# Patient Record
Sex: MALE | Race: WHITE | NOT HISPANIC OR LATINO | Employment: FULL TIME | URBAN - METROPOLITAN AREA
[De-identification: names, ages, dates, MRNs, and addresses within clinical notes are randomized per-mention and may not be internally consistent; named-entity substitution may affect disease eponyms.]

---

## 2019-08-14 ENCOUNTER — LAB (OUTPATIENT)
Dept: LAB | Facility: HOSPITAL | Age: 59
End: 2019-08-14

## 2019-08-14 ENCOUNTER — TRANSCRIBE ORDERS (OUTPATIENT)
Dept: ADMINISTRATIVE | Facility: HOSPITAL | Age: 59
End: 2019-08-14

## 2019-08-14 ENCOUNTER — OFFICE VISIT (OUTPATIENT)
Dept: CARDIOLOGY | Facility: CLINIC | Age: 59
End: 2019-08-14

## 2019-08-14 VITALS
RESPIRATION RATE: 16 BRPM | HEART RATE: 52 BPM | HEIGHT: 71 IN | WEIGHT: 215 LBS | SYSTOLIC BLOOD PRESSURE: 144 MMHG | DIASTOLIC BLOOD PRESSURE: 98 MMHG | BODY MASS INDEX: 30.1 KG/M2

## 2019-08-14 DIAGNOSIS — Z01.810 PRE-OPERATIVE CARDIOVASCULAR EXAMINATION: ICD-10-CM

## 2019-08-14 DIAGNOSIS — R07.2 PRECORDIAL PAIN: ICD-10-CM

## 2019-08-14 DIAGNOSIS — Z01.810 PRE-OPERATIVE CARDIOVASCULAR EXAMINATION: Primary | ICD-10-CM

## 2019-08-14 DIAGNOSIS — R94.39 ABNORMAL NUCLEAR STRESS TEST: ICD-10-CM

## 2019-08-14 DIAGNOSIS — E78.2 MIXED HYPERLIPIDEMIA: ICD-10-CM

## 2019-08-14 DIAGNOSIS — I10 ESSENTIAL HYPERTENSION: ICD-10-CM

## 2019-08-14 DIAGNOSIS — Z13.6 SCREENING FOR ISCHEMIC HEART DISEASE: ICD-10-CM

## 2019-08-14 DIAGNOSIS — R07.2 PRECORDIAL PAIN: Primary | ICD-10-CM

## 2019-08-14 LAB
ANION GAP SERPL CALCULATED.3IONS-SCNC: 9.4 MMOL/L (ref 5–15)
BASOPHILS # BLD AUTO: 0.04 10*3/MM3 (ref 0–0.2)
BASOPHILS NFR BLD AUTO: 0.5 % (ref 0–1.5)
BUN BLD-MCNC: 20 MG/DL (ref 6–20)
BUN/CREAT SERPL: 18 (ref 7–25)
CALCIUM SPEC-SCNC: 9.8 MG/DL (ref 8.6–10.5)
CHLORIDE SERPL-SCNC: 101 MMOL/L (ref 98–107)
CO2 SERPL-SCNC: 27.6 MMOL/L (ref 22–29)
CREAT BLD-MCNC: 1.11 MG/DL (ref 0.76–1.27)
DEPRECATED RDW RBC AUTO: 41.2 FL (ref 37–54)
EOSINOPHIL # BLD AUTO: 0.2 10*3/MM3 (ref 0–0.4)
EOSINOPHIL NFR BLD AUTO: 2.5 % (ref 0.3–6.2)
ERYTHROCYTE [DISTWIDTH] IN BLOOD BY AUTOMATED COUNT: 11.3 % (ref 12.3–15.4)
GFR SERPL CREATININE-BSD FRML MDRD: 69 ML/MIN/1.73
GLUCOSE BLD-MCNC: 128 MG/DL (ref 65–99)
HCT VFR BLD AUTO: 53 % (ref 37.5–51)
HGB BLD-MCNC: 17.9 G/DL (ref 13–17.7)
IMM GRANULOCYTES # BLD AUTO: 0.02 10*3/MM3 (ref 0–0.05)
IMM GRANULOCYTES NFR BLD AUTO: 0.2 % (ref 0–0.5)
LYMPHOCYTES # BLD AUTO: 2.42 10*3/MM3 (ref 0.7–3.1)
LYMPHOCYTES NFR BLD AUTO: 30.1 % (ref 19.6–45.3)
MCH RBC QN AUTO: 33.1 PG (ref 26.6–33)
MCHC RBC AUTO-ENTMCNC: 33.8 G/DL (ref 31.5–35.7)
MCV RBC AUTO: 98 FL (ref 79–97)
MONOCYTES # BLD AUTO: 0.82 10*3/MM3 (ref 0.1–0.9)
MONOCYTES NFR BLD AUTO: 10.2 % (ref 5–12)
NEUTROPHILS # BLD AUTO: 4.55 10*3/MM3 (ref 1.7–7)
NEUTROPHILS NFR BLD AUTO: 56.5 % (ref 42.7–76)
NRBC BLD AUTO-RTO: 0 /100 WBC (ref 0–0.2)
PLATELET # BLD AUTO: 178 10*3/MM3 (ref 140–450)
PMV BLD AUTO: 10.8 FL (ref 6–12)
POTASSIUM BLD-SCNC: 5.3 MMOL/L (ref 3.5–5.2)
RBC # BLD AUTO: 5.41 10*6/MM3 (ref 4.14–5.8)
SODIUM BLD-SCNC: 138 MMOL/L (ref 136–145)
WBC NRBC COR # BLD: 8.05 10*3/MM3 (ref 3.4–10.8)

## 2019-08-14 PROCEDURE — 99204 OFFICE O/P NEW MOD 45 MIN: CPT | Performed by: INTERNAL MEDICINE

## 2019-08-14 PROCEDURE — 36415 COLL VENOUS BLD VENIPUNCTURE: CPT

## 2019-08-14 PROCEDURE — 99407 BEHAV CHNG SMOKING > 10 MIN: CPT | Performed by: INTERNAL MEDICINE

## 2019-08-14 PROCEDURE — 85025 COMPLETE CBC W/AUTO DIFF WBC: CPT

## 2019-08-14 PROCEDURE — 80048 BASIC METABOLIC PNL TOTAL CA: CPT

## 2019-08-14 PROCEDURE — 93000 ELECTROCARDIOGRAM COMPLETE: CPT | Performed by: INTERNAL MEDICINE

## 2019-08-14 RX ORDER — AMLODIPINE BESYLATE 5 MG/1
5 TABLET ORAL DAILY
COMMUNITY
Start: 2019-07-31

## 2019-08-14 RX ORDER — AMOXICILLIN 500 MG
1200 CAPSULE ORAL DAILY
COMMUNITY
End: 2022-04-05 | Stop reason: ALTCHOICE

## 2019-08-14 NOTE — H&P (VIEW-ONLY)
PATIENTINFORMATION    Date of Office Visit: 2019  Encounter Provider: Bettina Sorto MD  Place of Service: [facility] CARDIOLOGY  Patient Name: Hector Ortega  : 1960    Subjective:     Encounter Date:2019      Patient ID: Hector Ortega is a 58 y.o. male.      Hypertension   This is a new problem. The current episode started 1 to 4 weeks ago. The problem has been gradually improving since onset. The problem is controlled. Associated symptoms include chest pain. Pertinent negatives include no malaise/fatigue or shortness of breath. Agents associated with hypertension include thyroid hormones. Risk factors for coronary artery disease include family history. Compliance problems include exercise.      This is a pleasant gentleman with a history of hyperlipidemia, premature coronary artery disease in his brother, and a long history of smoking. He has been noticing that when he exerts himself or is active, he is getting this dull burning pain in his chest. He does not feel like it is associated with shortness of breath or diaphoresis. It gets better when he rests. He has not had any rest chest pain. He apparently saw a cardiologist down in [place] and they recommended a nuclear stress test which was performed on 2019 and it was abnormal showing ischemia in the inferior wall. Heart catheterization was recommended but sounds like there were some issues with getting that scheduled so his primary doctor referred him here.    He continues to have exertional chest burning. It is better with rest. It does not occur at rest. There are no associated symptoms.      Review of Systems   Constitution: Negative for fever, malaise/fatigue, weight gain and weight loss.   HENT: Negative for ear pain, hearing loss, nosebleeds and sore throat.    Eyes: Negative for double vision, pain, vision loss in left eye and vision loss in right eye.   Cardiovascular: Positive for chest pain.   "       See history of present illness.   Respiratory: Negative for cough, shortness of breath, sleep disturbances due to breathing, snoring and wheezing.    Endocrine: Negative for cold intolerance, heat intolerance and polyuria.   Skin: Negative for itching, poor wound healing and rash.   Musculoskeletal: Negative for joint pain, joint swelling and myalgias.   Gastrointestinal: Negative for abdominal pain, diarrhea, hematochezia, nausea and vomiting.   Genitourinary: Negative for hematuria and hesitancy.   Neurological: Negative for numbness, paresthesias and seizures.   Psychiatric/Behavioral: Negative for depression. The patient is not nervous/anxious.            ECG 12 Lead  Date/Time: 8/14/2019 9:38 AM  Performed by: Bettina Sorto MD  Authorized by: Bettina Sorto MD   Previous ECG: no previous ECG available  Rhythm: sinus rhythm  BPM: 52  Conduction: conduction normal  ST Segments: ST segments normal  T Waves: T waves normal    Clinical impression: normal ECG               Objective:     /98 (BP Location: Right arm, Patient Position: Sitting)   Pulse 52   Resp 16   Ht 180.3 cm (71\")   Wt 97.5 kg (215 lb)   BMI 29.99 kg/m²  Body mass index is 29.99 kg/m².     Physical Exam   Constitutional: He appears well-developed.   HENT:   Head: Normocephalic and atraumatic.   Eyes: Conjunctivae and lids are normal. Pupils are equal, round, and reactive to light. Lids are everted and swept, no foreign bodies found.   Neck: Normal range of motion. No JVD present. Carotid bruit is not present. No tracheal deviation present. No thyroid mass present.   Cardiovascular: Normal rate, regular rhythm and normal heart sounds.   Pulses:       Dorsalis pedis pulses are 2+ on the right side, and 2+ on the left side.   Pulmonary/Chest: Effort normal and breath sounds normal.   Abdominal: Normal appearance and bowel sounds are normal.   Musculoskeletal: Normal range of motion.   Neurological: He is alert. He has normal " strength.   Skin: Skin is warm, dry and intact.   Psychiatric: He has a normal mood and affect. His behavior is normal.   Vitals reviewed.      Lab Review: I reviewed his labs from his primary doctor.  These were performed in July 2019 total cholesterol 179, triglycerides 161, HDL 21 and       Assessment/Plan:       1.  Chest pain consistent with ischemia.  He had an abnormal nuclear stress test with inferior wall ischemia at [facility] at the end of July.  His risk factors include hypertension, smoking, and premature coronary disease in his brother.  I recommend proceeding with a heart catheterization.  We discussed this and he and his wife were agreeable.  2.  Borderline diabetes.  3.  Systemic hypertension.  His blood pressure is elevated today.  I recommended that he monitor it.  We may need to adjust his medications.  4.  Hyperlipidemia.  I have reviewed his lipid panel and it is not great.  If he has coronary disease, then he will need statin therapy.  5.  Smoking.  He is a heavy smoker who is now down to about a pack a day.  I have encouraged him to discontinue smoking.  He wants to quit smoking, but is not ready to make a definitive plan or set a quit date.  6.  Brother with a heart attack in his mid 50s.    I will followup with him after his heart catheterization.    Hector Ortega is a current cigarettes user.  He currently smokes 1 pack of cigarettes per day for a duration of 40 years. I have educated him on the risk of diseases from using tobacco products such as heart diease.     I advised him to quit and he is not willing to quit.  He is contemplating quitting.    I spent >10 minutes counseling the patient.      Orders Placed This Encounter   Procedures   • ECG 12 Lead     This order was created via procedure documentation        Discharge Medications           Accurate as of 8/14/19 11:28 AM. If you have any questions, ask your nurse or doctor.               Continue These Medications       Instructions Start Date   amLODIPine 5 MG tablet  Commonly known as:  NORVASC   5 mg, Oral, Daily      aspirin 81 MG EC tablet   81 mg, Oral, Daily      atenolol 25 MG tablet  Commonly known as:  TENORMIN   25 mg, Oral, Daily      fish oil 1200 MG capsule capsule   1,200 mg, Oral, Daily      Peppermint Flavor oil   1 capsule, Oral, Daily                    Bettina Sorto MD  08/14/19  11:28 AM

## 2019-08-14 NOTE — PROGRESS NOTES
PATIENTINFORMATION    Date of Office Visit: 2019  Encounter Provider: Bettina Sorto MD  Place of Service: [facility] CARDIOLOGY  Patient Name: Hector Ortega  : 1960    Subjective:     Encounter Date:2019      Patient ID: Hector Ortega is a 58 y.o. male.      Hypertension   This is a new problem. The current episode started 1 to 4 weeks ago. The problem has been gradually improving since onset. The problem is controlled. Associated symptoms include chest pain. Pertinent negatives include no malaise/fatigue or shortness of breath. Agents associated with hypertension include thyroid hormones. Risk factors for coronary artery disease include family history. Compliance problems include exercise.      This is a pleasant gentleman with a history of hyperlipidemia, premature coronary artery disease in his brother, and a long history of smoking. He has been noticing that when he exerts himself or is active, he is getting this dull burning pain in his chest. He does not feel like it is associated with shortness of breath or diaphoresis. It gets better when he rests. He has not had any rest chest pain. He apparently saw a cardiologist down in [place] and they recommended a nuclear stress test which was performed on 2019 and it was abnormal showing ischemia in the inferior wall. Heart catheterization was recommended but sounds like there were some issues with getting that scheduled so his primary doctor referred him here.    He continues to have exertional chest burning. It is better with rest. It does not occur at rest. There are no associated symptoms.      Review of Systems   Constitution: Negative for fever, malaise/fatigue, weight gain and weight loss.   HENT: Negative for ear pain, hearing loss, nosebleeds and sore throat.    Eyes: Negative for double vision, pain, vision loss in left eye and vision loss in right eye.   Cardiovascular: Positive for chest pain.  "       See history of present illness.   Respiratory: Negative for cough, shortness of breath, sleep disturbances due to breathing, snoring and wheezing.    Endocrine: Negative for cold intolerance, heat intolerance and polyuria.   Skin: Negative for itching, poor wound healing and rash.   Musculoskeletal: Negative for joint pain, joint swelling and myalgias.   Gastrointestinal: Negative for abdominal pain, diarrhea, hematochezia, nausea and vomiting.   Genitourinary: Negative for hematuria and hesitancy.   Neurological: Negative for numbness, paresthesias and seizures.   Psychiatric/Behavioral: Negative for depression. The patient is not nervous/anxious.            ECG 12 Lead  Date/Time: 8/14/2019 9:38 AM  Performed by: Bettina Sorto MD  Authorized by: Bettina Sorto MD   Previous ECG: no previous ECG available  Rhythm: sinus rhythm  BPM: 52  Conduction: conduction normal  ST Segments: ST segments normal  T Waves: T waves normal    Clinical impression: normal ECG               Objective:     /98 (BP Location: Right arm, Patient Position: Sitting)   Pulse 52   Resp 16   Ht 180.3 cm (71\")   Wt 97.5 kg (215 lb)   BMI 29.99 kg/m²  Body mass index is 29.99 kg/m².     Physical Exam   Constitutional: He appears well-developed.   HENT:   Head: Normocephalic and atraumatic.   Eyes: Conjunctivae and lids are normal. Pupils are equal, round, and reactive to light. Lids are everted and swept, no foreign bodies found.   Neck: Normal range of motion. No JVD present. Carotid bruit is not present. No tracheal deviation present. No thyroid mass present.   Cardiovascular: Normal rate, regular rhythm and normal heart sounds.   Pulses:       Dorsalis pedis pulses are 2+ on the right side, and 2+ on the left side.   Pulmonary/Chest: Effort normal and breath sounds normal.   Abdominal: Normal appearance and bowel sounds are normal.   Musculoskeletal: Normal range of motion.   Neurological: He is alert. He has normal " strength.   Skin: Skin is warm, dry and intact.   Psychiatric: He has a normal mood and affect. His behavior is normal.   Vitals reviewed.      Lab Review: I reviewed his labs from his primary doctor.  These were performed in July 2019 total cholesterol 179, triglycerides 161, HDL 21 and       Assessment/Plan:       1.  Chest pain consistent with ischemia.  He had an abnormal nuclear stress test with inferior wall ischemia at [facility] at the end of July.  His risk factors include hypertension, smoking, and premature coronary disease in his brother.  I recommend proceeding with a heart catheterization.  We discussed this and he and his wife were agreeable.  2.  Borderline diabetes.  3.  Systemic hypertension.  His blood pressure is elevated today.  I recommended that he monitor it.  We may need to adjust his medications.  4.  Hyperlipidemia.  I have reviewed his lipid panel and it is not great.  If he has coronary disease, then he will need statin therapy.  5.  Smoking.  He is a heavy smoker who is now down to about a pack a day.  I have encouraged him to discontinue smoking.  He wants to quit smoking, but is not ready to make a definitive plan or set a quit date.  6.  Brother with a heart attack in his mid 50s.    I will followup with him after his heart catheterization.    Hector Ortega is a current cigarettes user.  He currently smokes 1 pack of cigarettes per day for a duration of 40 years. I have educated him on the risk of diseases from using tobacco products such as heart diease.     I advised him to quit and he is not willing to quit.  He is contemplating quitting.    I spent >10 minutes counseling the patient.      Orders Placed This Encounter   Procedures   • ECG 12 Lead     This order was created via procedure documentation        Discharge Medications           Accurate as of 8/14/19 11:28 AM. If you have any questions, ask your nurse or doctor.               Continue These Medications       Instructions Start Date   amLODIPine 5 MG tablet  Commonly known as:  NORVASC   5 mg, Oral, Daily      aspirin 81 MG EC tablet   81 mg, Oral, Daily      atenolol 25 MG tablet  Commonly known as:  TENORMIN   25 mg, Oral, Daily      fish oil 1200 MG capsule capsule   1,200 mg, Oral, Daily      Peppermint Flavor oil   1 capsule, Oral, Daily                    Bettina Sorto MD  08/14/19  11:28 AM

## 2019-08-20 ENCOUNTER — HOSPITAL ENCOUNTER (OUTPATIENT)
Facility: HOSPITAL | Age: 59
Discharge: HOME | End: 2019-08-21
Attending: INTERNAL MEDICINE | Admitting: INTERNAL MEDICINE

## 2019-08-20 DIAGNOSIS — R07.2 PRECORDIAL PAIN: ICD-10-CM

## 2019-08-20 DIAGNOSIS — E78.2 MIXED HYPERLIPIDEMIA: ICD-10-CM

## 2019-08-20 DIAGNOSIS — I10 ESSENTIAL HYPERTENSION: ICD-10-CM

## 2019-08-20 DIAGNOSIS — I25.110 CORONARY ARTERY DISEASE INVOLVING NATIVE CORONARY ARTERY OF NATIVE HEART WITH UNSTABLE ANGINA PECTORIS (HCC): Primary | ICD-10-CM

## 2019-08-20 PROCEDURE — G0378 HOSPITAL OBSERVATION PER HR: HCPCS

## 2019-08-20 PROCEDURE — 25010000002 HEPARIN (PORCINE) PER 1000 UNITS: Performed by: INTERNAL MEDICINE

## 2019-08-20 PROCEDURE — 92928 PRQ TCAT PLMT NTRAC ST 1 LES: CPT | Performed by: INTERNAL MEDICINE

## 2019-08-20 PROCEDURE — C1874 STENT, COATED/COV W/DEL SYS: HCPCS | Performed by: INTERNAL MEDICINE

## 2019-08-20 PROCEDURE — 99152 MOD SED SAME PHYS/QHP 5/>YRS: CPT | Performed by: INTERNAL MEDICINE

## 2019-08-20 PROCEDURE — C1725 CATH, TRANSLUMIN NON-LASER: HCPCS | Performed by: INTERNAL MEDICINE

## 2019-08-20 PROCEDURE — C1769 GUIDE WIRE: HCPCS | Performed by: INTERNAL MEDICINE

## 2019-08-20 PROCEDURE — 25010000002 FENTANYL CITRATE (PF) 100 MCG/2ML SOLUTION: Performed by: INTERNAL MEDICINE

## 2019-08-20 PROCEDURE — 93010 ELECTROCARDIOGRAM REPORT: CPT | Performed by: INTERNAL MEDICINE

## 2019-08-20 PROCEDURE — 85347 COAGULATION TIME ACTIVATED: CPT

## 2019-08-20 PROCEDURE — 93458 L HRT ARTERY/VENTRICLE ANGIO: CPT | Performed by: INTERNAL MEDICINE

## 2019-08-20 PROCEDURE — C1894 INTRO/SHEATH, NON-LASER: HCPCS | Performed by: INTERNAL MEDICINE

## 2019-08-20 PROCEDURE — C1887 CATHETER, GUIDING: HCPCS | Performed by: INTERNAL MEDICINE

## 2019-08-20 PROCEDURE — 93005 ELECTROCARDIOGRAM TRACING: CPT | Performed by: INTERNAL MEDICINE

## 2019-08-20 PROCEDURE — C9600 PERC DRUG-EL COR STENT SING: HCPCS | Performed by: INTERNAL MEDICINE

## 2019-08-20 PROCEDURE — 0 IOPAMIDOL PER 1 ML: Performed by: INTERNAL MEDICINE

## 2019-08-20 PROCEDURE — 25010000002 MIDAZOLAM PER 1 MG: Performed by: INTERNAL MEDICINE

## 2019-08-20 PROCEDURE — 99153 MOD SED SAME PHYS/QHP EA: CPT | Performed by: INTERNAL MEDICINE

## 2019-08-20 DEVICE — XIENCE SIERRA™ EVEROLIMUS ELUTING CORONARY STENT SYSTEM 3.50 MM X 38 MM / RAPID-EXCHANGE
Type: IMPLANTABLE DEVICE | Status: FUNCTIONAL
Brand: XIENCE SIERRA™

## 2019-08-20 DEVICE — XIENCE SIERRA™ EVEROLIMUS ELUTING CORONARY STENT SYSTEM 4.00 MM X 12 MM / RAPID-EXCHANGE
Type: IMPLANTABLE DEVICE | Status: FUNCTIONAL
Brand: XIENCE SIERRA™

## 2019-08-20 RX ORDER — ATORVASTATIN CALCIUM 20 MG/1
40 TABLET, FILM COATED ORAL NIGHTLY
Status: DISCONTINUED | OUTPATIENT
Start: 2019-08-20 | End: 2019-08-21 | Stop reason: HOSPADM

## 2019-08-20 RX ORDER — FENTANYL CITRATE 50 UG/ML
INJECTION, SOLUTION INTRAMUSCULAR; INTRAVENOUS AS NEEDED
Status: DISCONTINUED | OUTPATIENT
Start: 2019-08-20 | End: 2019-08-20 | Stop reason: HOSPADM

## 2019-08-20 RX ORDER — SODIUM CHLORIDE 9 MG/ML
50 INJECTION, SOLUTION INTRAVENOUS CONTINUOUS
Status: ACTIVE | OUTPATIENT
Start: 2019-08-20 | End: 2019-08-21

## 2019-08-20 RX ORDER — AMLODIPINE BESYLATE 5 MG/1
5 TABLET ORAL DAILY
Status: DISCONTINUED | OUTPATIENT
Start: 2019-08-20 | End: 2019-08-21 | Stop reason: HOSPADM

## 2019-08-20 RX ORDER — SODIUM CHLORIDE 9 MG/ML
75 INJECTION, SOLUTION INTRAVENOUS CONTINUOUS
Status: DISCONTINUED | OUTPATIENT
Start: 2019-08-20 | End: 2019-08-21 | Stop reason: HOSPADM

## 2019-08-20 RX ORDER — NALOXONE HCL 0.4 MG/ML
0.4 VIAL (ML) INJECTION
Status: DISCONTINUED | OUTPATIENT
Start: 2019-08-20 | End: 2019-08-21 | Stop reason: HOSPADM

## 2019-08-20 RX ORDER — SODIUM CHLORIDE 0.9 % (FLUSH) 0.9 %
3 SYRINGE (ML) INJECTION EVERY 12 HOURS SCHEDULED
Status: DISCONTINUED | OUTPATIENT
Start: 2019-08-20 | End: 2019-08-20 | Stop reason: HOSPADM

## 2019-08-20 RX ORDER — LIDOCAINE HYDROCHLORIDE 10 MG/ML
0.1 INJECTION, SOLUTION EPIDURAL; INFILTRATION; INTRACAUDAL; PERINEURAL ONCE AS NEEDED
Status: DISCONTINUED | OUTPATIENT
Start: 2019-08-20 | End: 2019-08-20 | Stop reason: HOSPADM

## 2019-08-20 RX ORDER — LEVOTHYROXINE SODIUM 0.07 MG/1
75 TABLET ORAL DAILY
COMMUNITY

## 2019-08-20 RX ORDER — HYDROCODONE BITARTRATE AND ACETAMINOPHEN 5; 325 MG/1; MG/1
1 TABLET ORAL EVERY 4 HOURS PRN
Status: DISCONTINUED | OUTPATIENT
Start: 2019-08-20 | End: 2019-08-21 | Stop reason: HOSPADM

## 2019-08-20 RX ORDER — LEVOTHYROXINE SODIUM 0.07 MG/1
75 TABLET ORAL DAILY
Status: DISCONTINUED | OUTPATIENT
Start: 2019-08-20 | End: 2019-08-21 | Stop reason: HOSPADM

## 2019-08-20 RX ORDER — MORPHINE SULFATE 2 MG/ML
1 INJECTION, SOLUTION INTRAMUSCULAR; INTRAVENOUS EVERY 4 HOURS PRN
Status: DISCONTINUED | OUTPATIENT
Start: 2019-08-20 | End: 2019-08-21 | Stop reason: HOSPADM

## 2019-08-20 RX ORDER — SODIUM CHLORIDE 0.9 % (FLUSH) 0.9 %
3-10 SYRINGE (ML) INJECTION AS NEEDED
Status: DISCONTINUED | OUTPATIENT
Start: 2019-08-20 | End: 2019-08-20 | Stop reason: HOSPADM

## 2019-08-20 RX ORDER — TEMAZEPAM 15 MG/1
15 CAPSULE ORAL NIGHTLY PRN
Status: DISCONTINUED | OUTPATIENT
Start: 2019-08-20 | End: 2019-08-21 | Stop reason: HOSPADM

## 2019-08-20 RX ORDER — ATENOLOL 25 MG/1
25 TABLET ORAL DAILY
Status: DISCONTINUED | OUTPATIENT
Start: 2019-08-20 | End: 2019-08-21 | Stop reason: HOSPADM

## 2019-08-20 RX ORDER — MIDAZOLAM HYDROCHLORIDE 1 MG/ML
INJECTION INTRAMUSCULAR; INTRAVENOUS AS NEEDED
Status: DISCONTINUED | OUTPATIENT
Start: 2019-08-20 | End: 2019-08-20 | Stop reason: HOSPADM

## 2019-08-20 RX ORDER — ACETAMINOPHEN 325 MG/1
650 TABLET ORAL EVERY 4 HOURS PRN
Status: DISCONTINUED | OUTPATIENT
Start: 2019-08-20 | End: 2019-08-21 | Stop reason: HOSPADM

## 2019-08-20 RX ORDER — LIDOCAINE HYDROCHLORIDE 20 MG/ML
INJECTION, SOLUTION INFILTRATION; PERINEURAL AS NEEDED
Status: DISCONTINUED | OUTPATIENT
Start: 2019-08-20 | End: 2019-08-20 | Stop reason: HOSPADM

## 2019-08-20 RX ORDER — HEPARIN SODIUM 1000 [USP'U]/ML
INJECTION, SOLUTION INTRAVENOUS; SUBCUTANEOUS AS NEEDED
Status: DISCONTINUED | OUTPATIENT
Start: 2019-08-20 | End: 2019-08-20 | Stop reason: HOSPADM

## 2019-08-20 RX ORDER — ASPIRIN 81 MG/1
81 TABLET ORAL DAILY
Status: DISCONTINUED | OUTPATIENT
Start: 2019-08-20 | End: 2019-08-21 | Stop reason: HOSPADM

## 2019-08-20 RX ADMIN — ATORVASTATIN CALCIUM 40 MG: 20 TABLET, FILM COATED ORAL at 20:24

## 2019-08-20 RX ADMIN — SODIUM CHLORIDE 75 ML/HR: 9 INJECTION, SOLUTION INTRAVENOUS at 09:20

## 2019-08-20 RX ADMIN — TICAGRELOR 90 MG: 90 TABLET ORAL at 20:24

## 2019-08-20 RX ADMIN — SODIUM CHLORIDE 50 ML/HR: 9 INJECTION, SOLUTION INTRAVENOUS at 16:00

## 2019-08-20 RX ADMIN — Medication 3 ML: at 12:46

## 2019-08-20 NOTE — PLAN OF CARE
Problem: Patient Care Overview  Goal: Plan of Care Review  Outcome: Ongoing (interventions implemented as appropriate)   08/20/19 6281   Coping/Psychosocial   Plan of Care Reviewed With patient   Plan of Care Review   Progress improving   OTHER   Outcome Summary S/P PCI, right radial site C/D/I and soft. No c/o's of pain or SOA. IVFs currently infusing. VSS. Will continue to monitor.

## 2019-08-20 NOTE — PLAN OF CARE
Problem: Cardiac Catheterization (Diagnostic/Interventional) (Adult)  Goal: Signs and Symptoms of Listed Potential Problems Will be Absent, Minimized or Managed (Cardiac Catheterization)  Outcome: Ongoing (interventions implemented as appropriate)    Goal: Anesthesia/Sedation Recovery  Outcome: Ongoing (interventions implemented as appropriate)      Problem: Cardiac: ACS (Acute Coronary Syndrome) (Adult)  Goal: Signs and Symptoms of Listed Potential Problems Will be Absent, Minimized or Managed (Cardiac: ACS)  Outcome: Ongoing (interventions implemented as appropriate)

## 2019-08-21 VITALS
TEMPERATURE: 97.5 F | HEART RATE: 54 BPM | OXYGEN SATURATION: 97 % | BODY MASS INDEX: 29.96 KG/M2 | WEIGHT: 214 LBS | SYSTOLIC BLOOD PRESSURE: 131 MMHG | HEIGHT: 71 IN | DIASTOLIC BLOOD PRESSURE: 100 MMHG | RESPIRATION RATE: 16 BRPM

## 2019-08-21 LAB
ACT BLD: 351 SECONDS (ref 82–152)
ANION GAP SERPL CALCULATED.3IONS-SCNC: 13.1 MMOL/L (ref 5–15)
BUN BLD-MCNC: 17 MG/DL (ref 6–20)
BUN/CREAT SERPL: 16 (ref 7–25)
CALCIUM SPEC-SCNC: 8.8 MG/DL (ref 8.6–10.5)
CHLORIDE SERPL-SCNC: 103 MMOL/L (ref 98–107)
CHOLEST SERPL-MCNC: 157 MG/DL (ref 0–200)
CO2 SERPL-SCNC: 23.9 MMOL/L (ref 22–29)
CREAT BLD-MCNC: 1.06 MG/DL (ref 0.76–1.27)
DEPRECATED RDW RBC AUTO: 41.7 FL (ref 37–54)
ERYTHROCYTE [DISTWIDTH] IN BLOOD BY AUTOMATED COUNT: 11.5 % (ref 12.3–15.4)
GFR SERPL CREATININE-BSD FRML MDRD: 72 ML/MIN/1.73
GLUCOSE BLD-MCNC: 108 MG/DL (ref 65–99)
HBA1C MFR BLD: 5.6 % (ref 4.8–5.6)
HCT VFR BLD AUTO: 48 % (ref 37.5–51)
HDLC SERPL-MCNC: 20 MG/DL (ref 40–60)
HGB BLD-MCNC: 16.2 G/DL (ref 13–17.7)
LDLC SERPL CALC-MCNC: 71 MG/DL (ref 0–100)
LDLC/HDLC SERPL: 3.55 {RATIO}
MCH RBC QN AUTO: 33.1 PG (ref 26.6–33)
MCHC RBC AUTO-ENTMCNC: 33.8 G/DL (ref 31.5–35.7)
MCV RBC AUTO: 98.2 FL (ref 79–97)
PLATELET # BLD AUTO: 157 10*3/MM3 (ref 140–450)
PMV BLD AUTO: 10.7 FL (ref 6–12)
POTASSIUM BLD-SCNC: 3.7 MMOL/L (ref 3.5–5.2)
RBC # BLD AUTO: 4.89 10*6/MM3 (ref 4.14–5.8)
SODIUM BLD-SCNC: 140 MMOL/L (ref 136–145)
TRIGL SERPL-MCNC: 330 MG/DL (ref 0–150)
VLDLC SERPL-MCNC: 66 MG/DL (ref 5–40)
WBC NRBC COR # BLD: 7.7 10*3/MM3 (ref 3.4–10.8)

## 2019-08-21 PROCEDURE — 80048 BASIC METABOLIC PNL TOTAL CA: CPT | Performed by: INTERNAL MEDICINE

## 2019-08-21 PROCEDURE — 83036 HEMOGLOBIN GLYCOSYLATED A1C: CPT | Performed by: INTERNAL MEDICINE

## 2019-08-21 PROCEDURE — 93010 ELECTROCARDIOGRAM REPORT: CPT | Performed by: INTERNAL MEDICINE

## 2019-08-21 PROCEDURE — 80061 LIPID PANEL: CPT | Performed by: INTERNAL MEDICINE

## 2019-08-21 PROCEDURE — 85027 COMPLETE CBC AUTOMATED: CPT | Performed by: INTERNAL MEDICINE

## 2019-08-21 PROCEDURE — 99214 OFFICE O/P EST MOD 30 MIN: CPT | Performed by: NURSE PRACTITIONER

## 2019-08-21 PROCEDURE — 93005 ELECTROCARDIOGRAM TRACING: CPT | Performed by: INTERNAL MEDICINE

## 2019-08-21 RX ORDER — ATORVASTATIN CALCIUM 40 MG/1
40 TABLET, FILM COATED ORAL NIGHTLY
Qty: 30 TABLET | Refills: 11 | Status: SHIPPED | OUTPATIENT
Start: 2019-08-21 | End: 2020-08-28

## 2019-08-21 RX ADMIN — LEVOTHYROXINE SODIUM 75 MCG: 75 TABLET ORAL at 08:34

## 2019-08-21 RX ADMIN — AMLODIPINE BESYLATE 5 MG: 5 TABLET ORAL at 08:34

## 2019-08-21 RX ADMIN — TICAGRELOR 90 MG: 90 TABLET ORAL at 08:34

## 2019-08-21 RX ADMIN — ASPIRIN 81 MG: 81 TABLET, COATED ORAL at 08:34

## 2019-08-21 RX ADMIN — ATENOLOL 25 MG: 25 TABLET ORAL at 08:34

## 2019-08-21 NOTE — CONSULTS
"Met with patient and family, discussed benefits of cardiac rehab. Provided phase II information packet, which includes; general information about cardiac rehab, [facility] Cardiac Rehab Programs handout and [employer] letter article entitled “Cardiac Rehab is often the Best Medicine for Recovery\", stresses the importance of cardiac rehab after a heart event.   I provided the contact information for cardiac rehab here at [facility] and encouraged him to call when discharged.   Instructed to bring a copy of After Visit Summary to initial assessment.      "

## 2019-08-21 NOTE — DISCHARGE SUMMARY
Hospital Discharge    Patient Name: Hector Ortega  Age/Sex: 58 y.o. male  : 1960  MRN: [id number]    Encounter Provider: KITA Bustillos  Referring Provider: Mahin Dominique MD  Place of Service: [facility] CARDIOLOGY  Patient Care Team:  Benjamin Barrow MD as PCP - General (Family Medicine)         Date of Discharge:  2019   Date of Admit: 2019    Discharge Condition: Stable  Discharge Diagnosis:    Precordial pain    Essential hypertension    Mixed hyperlipidemia  coronary artery disease of native artery with unstable angina.     Hospital Course:   Hector Ortega is a 58 y.o. male who was seen in the office on  by Dr. Sorto as a new patient. He is a current smoker and has a family history of premature coronary artery disease in his brother. He complained of dull burning pain in his chest with exertion. He had a recent stress in [place] which showed inferior ischemia. A cardiac cath was recommended and due to scheduling issues was not performed and he was referred to our office. On  he had a cardiac cath that showed chronic total occlusion of the RCA with left to right collaterals as well as 95% mid LAD stenosis. This was treated with drug eluting stent placement (3.5 x 38 mm Xience Trina drug eluting stent). A second stent was placed to the prox LAD for residual stenosis (4.0 X 12 mm Xience Trina). He had normal LV size and function per LV gram.  He was monitored overnight. His BP was mildly high. He had recently been started on amlodipine by Dr. Sorto. We will follow closely as an outpatient.     Patient will see KITA Blackburn in one week and Dr. Sorto in one month. He is a current smoker and was encouraged to stop.     Objective:  Temp:  [97.5 °F (36.4 °C)-98.2 °F (36.8 °C)] 97.5 °F (36.4 °C)  Heart Rate:  [47-68] 54  Resp:  [16] 16  BP: (126-154)/() 131/100    Intake/Output Summary (Last 24 hours) at 2019 1246  Last data filed  at 8/21/2019 1154  Gross per 24 hour   Intake 1170 ml   Output --   Net 1170 ml     Body mass index is 29.85 kg/m².      08/20/19  0902   Weight: 97.1 kg (214 lb)     Weight change:     Physical Exam:  Constitutional: He is oriented to person, place, and time. He appears well-developed. He does not appear ill.   Neck: No JVD present.   Cardiovascular: Normal rate, regular rhythm and normal heart sounds.    Pulses:       Posterior tibial pulses are 2+ on the right side, and 2+ on the left side.   Pulmonary/Chest: Effort normal and breath sounds normal.   Abdominal: Soft. Normal appearance and bowel sounds are normal. There is no tenderness.   Musculoskeletal: Normal range of motion.        Right shoulder: He exhibits no deformity.        Left shoulder: He exhibits no deformity.   Neurological: He is alert and oriented to person, place, and time. He has normal strength.   Skin: Skin is warm, dry and intact. No rash noted.   Psychiatric: He has a normal mood and affect. His behavior is normal. Thought content normal.   Vitals reviewed  Right radial artery, soft, no hematoma. Pulses intact.     Procedures Performed  Procedure(s):  Coronary angiography  Left Heart Cath  Left ventriculography  Stent PENG coronary    Procedure findings:  Left main: Large caliber vessel that bifurcates to an LAD and circumflex.  Normal  LAD: Medium caliber vessel that gives rise to a small caliber diagonal branch in the proximal segment and a small caliber common septal .  The proximal LAD is normal.  The mid LAD has a hazy, calcified, 95% stenosis with WILLY II flow distally.  The small caliber diagonal branch has a 60% proximal stenosis  LCX: Medium caliber vessel that gives rise to a small caliber OM1 branch.  The circumflex has luminal irregularities.  RCA: Large-caliber, dominant vessel it is chronically occluded in the proximal segment and fills faintly via bridging collaterals but mostly through left to right  collaterals.     Left ventricular angiography: Normal left ventricular size and systolic function.  Left ventricular ejection fraction is 55%.     Percutaneous intervention report: Unfractionated heparin was used for anticoagulation and confirm therapeutic ACT.  A 6 Cymraes XB 3.5 guide catheter was used to engage left main coronary artery.  Following this a 0.014 inch run-through wire was used to cross the lesion and seated in the distal LAD.  A 3.5 x 20 mm trek balloon was used to predilate the lesion.  A 3.5 x 38 mm Xience Trina drug-eluting stent was deployed across the mid LAD stenosis at 14 cleo.  This was postdilated with a 3.5 x 20 mm noncompliant trek balloon to high pressure and a 3.75 x 12 mm noncompliant trek to24 cleo.  There was residual lesion in the proximal LAD that was covered with a 4.0 x 12 mm Xience Trina drug-eluting stent.  The stent balloon was used to post dilate the proximal stent and the overlapping segment.  The common septal  was jailed by stent area patient was chest pain-free and no additional intervention was undertaken.     Hemodynamics:   LV: 98/8  AO: 98/60        PCI CORONARY SEGMENT: Mid LAD  PRE-STENOSIS: 95  POST-STENOSIS: 0%  LESION TYPE: c  WILLY FLOW PRE/POST: 2/3  CULPRIT LESION: Yes     Estimated blood loss: Minimal  Complications: None     Conclusions:   1. Left main: Normal  2. LAD: Hazy, calcified 95% mid vessel stenosis with WILLY II flow  3. LCX: Luminal irregularities  4. RCA: Chronic total occlusion in the proximal segment.  Fills via left to right collaterals.  5.  Normal left ventricular size and systolic function  6.  PCI of the proximal to mid LAD with overlapping 4.0 x 12 mm and 3.5 x 38 mm Xience Trina drug-eluting stents, postdilated to high pressure     Recommendations: Dual antiplatelet therapy for at least one year.  Medical management of RCA        Consults:  Consults     No orders found for last 30 day(s).          Pertinent Test  Results:  Results from last 7 days   Lab Units 08/21/19  0503   SODIUM mmol/L 140   POTASSIUM mmol/L 3.7   CHLORIDE mmol/L 103   CO2 mmol/L 23.9   BUN mg/dL 17   CREATININE mg/dL 1.06   GLUCOSE mg/dL 108*   CALCIUM mg/dL 8.8         Results from last 7 days   Lab Units 08/21/19  0503   WBC 10*3/mm3 7.70   HEMOGLOBIN g/dL 16.2   HEMATOCRIT % 48.0   PLATELETS 10*3/mm3 157             Results from last 7 days   Lab Units 08/21/19  0503   CHOLESTEROL mg/dL 157   TRIGLYCERIDES mg/dL 330*   HDL CHOL mg/dL 20*               Discharge Medications     Discharge Medications      New Medications      Instructions Start Date   atorvastatin 40 MG tablet  Commonly known as:  LIPITOR   40 mg, Oral, Nightly      ticagrelor 90 MG tablet tablet  Commonly known as:  BRILINTA   90 mg, Oral, 2 Times Daily         Continue These Medications      Instructions Start Date   amLODIPine 5 MG tablet  Commonly known as:  NORVASC   5 mg, Oral, Daily      aspirin 81 MG EC tablet   81 mg, Oral, Daily      atenolol 25 MG tablet  Commonly known as:  TENORMIN   25 mg, Oral, Daily      fish oil 1200 MG capsule capsule   1,200 mg, Oral, Daily      levothyroxine 75 MCG tablet  Commonly known as:  SYNTHROID, LEVOTHROID   75 mcg, Oral, Daily      Peppermint Flavor oil   1 capsule, Oral, Daily             Discharge Diet:    Dietary Orders (From admission, onward)    Start     Ordered    08/20/19 1500  Diet Regular; Consistent Carbohydrate, Cardiac  Diet Effective Now     Question Answer Comment   Diet Texture / Consistency Regular    Common Modifiers Consistent Carbohydrate    Common Modifiers Cardiac        08/20/19 1500          Activity at Discharge:    Activity Instructions     Gradually Increase Activity Until at Pre-Hospitalization Level      Routine post cardiac catheterization/PCI discharge home care instructions:    1. No submerging procedure site below water for 7-10 days.  2. No lifting objects greater than 1 lbs for 3 days.  3. If groin site  used, avoid climbing several flights of stairs or sitting for longer than 2 hours at a time for the next 24 hours.   4. Monitor puncture site for bleeding and/or knots;. If bleeding should occur at the groin site: lie flat, apply pressure and return to the ER. If bleeding should occur at the wrist site, apply pressure and return to the ER.  5.  You may apply a DRY Band-Aid over the puncture site if needed. Do not apply any lotions, salves or ointments to site.  6. No driving for 24 hrs  7. Return to ER for recurrent symptoms.  8. No smoking.  9. Take all medications as prescribed.           Discharge disposition: home     Discharge Instructions and Follow ups:  Future Appointments   Date Time Provider Department Center   8/27/2019 10:30 AM Johana Hernández DNP, APRN [facility] None   10/11/2019 11:00 AM Bettina Sorto MD [facility] None     Additional Instructions for the Follow-ups that You Need to Schedule     Ambulatory Referral to Cardiac Rehab   As directed      Discharge Follow-up with Specified Provider: Dr. Sorto; 1 Month   As directed      To:  Dr. Sorto    Follow Up:  1 Month         Discharge Follow-up with Specified Provider: KITA Blackburn; 1 Week   As directed      To:  KITA Blackburn    Follow Up:  1 Week           Follow-up Information     Benjamin Barrow MD. Schedule an appointment as soon as possible for a visit in 1 week(s).    Specialty:  Family Medicine  Why:  A message was left with the office, please call later today about your one week appointment.   Contact information:  [address]  [facility] [address] [phone]             Bettina Sorto MD. Schedule an appointment as soon as possible for a visit in 1 month(s).    Specialty:  Cardiology  Why:  Call the office for your appointments at one week and one month  Contact information:  [address]  [facility] [facility] [address] [phone]                   Test Results Pending at Discharge: none     Evy DUNNE  KITA Joel  08/21/19  12:46 PM

## 2019-08-21 NOTE — PLAN OF CARE
Problem: Patient Care Overview  Goal: Plan of Care Review  Outcome: Outcome(s) achieved Date Met: 08/21/19 08/21/19 6672   Coping/Psychosocial   Plan of Care Reviewed With patient   Plan of Care Review   Progress improving   OTHER   Outcome Summary Patient being discharged. Denies complaints. Spouse to take patient home. Vital signs stable.

## 2019-08-22 ENCOUNTER — TELEPHONE (OUTPATIENT)
Dept: CARDIAC REHAB | Facility: HOSPITAL | Age: 59
End: 2019-08-22

## 2019-08-22 NOTE — TELEPHONE ENCOUNTER
Referral received.  Called patient to set up outpatient cardiac rehab.  He states since he was discharged yesterday, he & his wife haven't decided where would be closest facility for cardiac rehab.  (He lives in [place] and unfortunately [facility] no longer offers the program.)  We discussed possibilities ([facility]) which patient states he will review with his wife.  He also may check with his insurance company regarding coverage/cost to him.  I was able to do some smoking cessation and general risk factor education with him over the phone.  He was receptive and states that he hasn't smoked a whole cigarette since discharge.  States he will call me back if any questions or if he wants to schedule his cardiac rehab here at [facility].  Thank you for the referral!

## 2019-08-27 ENCOUNTER — OFFICE VISIT (OUTPATIENT)
Dept: CARDIOLOGY | Facility: CLINIC | Age: 59
End: 2019-08-27

## 2019-08-27 VITALS
WEIGHT: 218.2 LBS | BODY MASS INDEX: 30.55 KG/M2 | SYSTOLIC BLOOD PRESSURE: 130 MMHG | HEART RATE: 50 BPM | DIASTOLIC BLOOD PRESSURE: 90 MMHG | HEIGHT: 71 IN

## 2019-08-27 DIAGNOSIS — Z95.820 S/P ANGIOPLASTY WITH STENT: ICD-10-CM

## 2019-08-27 DIAGNOSIS — E78.2 MIXED HYPERLIPIDEMIA: ICD-10-CM

## 2019-08-27 DIAGNOSIS — I10 ESSENTIAL HYPERTENSION: ICD-10-CM

## 2019-08-27 DIAGNOSIS — I25.10 CORONARY ARTERY DISEASE INVOLVING NATIVE CORONARY ARTERY OF NATIVE HEART WITHOUT ANGINA PECTORIS: Primary | ICD-10-CM

## 2019-08-27 PROCEDURE — 93000 ELECTROCARDIOGRAM COMPLETE: CPT | Performed by: NURSE PRACTITIONER

## 2019-08-27 PROCEDURE — 99214 OFFICE O/P EST MOD 30 MIN: CPT | Performed by: NURSE PRACTITIONER

## 2019-08-27 RX ORDER — METOPROLOL SUCCINATE 25 MG/1
25 TABLET, EXTENDED RELEASE ORAL DAILY
Qty: 30 TABLET | Refills: 0 | Status: SHIPPED | OUTPATIENT
Start: 2019-08-27 | End: 2019-09-18 | Stop reason: SDUPTHER

## 2019-08-27 NOTE — PROGRESS NOTES
Date of Office Visit: 2019  Encounter Provider: Johana Hernández, BRANDON, APRN  Place of Service: [facility] CARDIOLOGY  Patient Name: Hector Ortega  :1960        Subjective:     Chief Complaint:  Follow-up, coronary artery disease status post stent placement, hypertension.      History of Present Illness:  Hector Ortega is a 58 y.o. male patient of Dr. Sorto.  This is my first time seeing this patient in the office today and I reviewed his records.    Patient has a history of coronary artery disease status post stent placement, hypertension, hyperlipidemia, smoking, borderline diabetes, family history of premature coronary artery disease.    Patient was seen in office 2019 he reported a dull burning pain in his chest that occurred with or with being active.  Not associated with diaphoresis or shortness of breath.  Rest.  Cardiologist in [place] who recommended nuclear stress test which was done 2019 showing cessation was recommended however.  Patient continued to have exertional chest burning symptoms that improved with rest while seen by Dr. Sorto.  Heart catheterization was recommended as well as smoking cessation.  Patient underwent heart catheterization 19.  This showed normal left ventricular size and systolic function with EF of 55%.  Mid LAD had a hazy calcified 95% stenosis.  Small caliber diagonal branch has 60% stenosis.  Circumflex had some luminal irregularities.  RCA was a large caliber dominant vessel with chronic occlusion in the proximal segment with bridging collaterals.  Patient underwent drug-eluting stent placement to the proximal to mid LAD with overlapping Xience drug-eluting stents.  Dual antiplatelet therapy recommended for at least one year as well as medical management of RCA chronic total occlusion.      Patient presents to office today for follow-up appointment.  Patient reports he has been doing well overall since hospital  discharge.  He reports that he continues to have some mild burning in his chest with exertion, such as going up the driveway in the heat after walking his dog, however it has improved since the stent was placed and is not as bad as it was before the stent was placed.  He denies any worsening shortness of breath symptoms.  He has not missed any doses of his aspirin or Brilinta.  He is working on cutting back and cutting out smoking.  He is down to 6 cigarettes a day and will continue to work to cut back.  I set a goal for him to be a non-smoker by his October follow-up visit.  He denies any palpitations, racing heartbeat sensation, lower extremity edema, dizziness, or abnormal fatigue.  Diastolic blood pressure is slightly elevated in the office today.  He reports blood pressure was around 129/89 yesterday.  He does report that heart rate stays in the 60s outside the office.  At this point we will change him from atenolol to metoprolol with continued monitoring of heart rate and blood pressure and he will call for heart rate staying less than 50 or blood pressure staying greater than 130/80.  He reports that he has been in contact with cardiac rehab but has not scheduled initial evaluation yet.  I encouraged him to go ahead and do this.  We will also get him set up for his rehab stress test this week.  We will check a lipid panel and CMP prior to his October follow-up appointment.        Past Medical History:   Diagnosis Date   • Borderline diabetic    • CAD (coronary artery disease)    • Diverticulitis of colon    • Hypertension    • Mixed hyperlipidemia    • Precordial pain    • Thyroid nodule      Past Surgical History:   Procedure Laterality Date   • CARDIAC CATHETERIZATION     • CARDIAC CATHETERIZATION N/A 8/20/2019    Procedure: Coronary angiography;  Surgeon: Mahin Dominique MD;  Location: [facility] INVASIVE LOCATION;  Service: Cardiovascular   • CARDIAC CATHETERIZATION N/A 8/20/2019    Procedure:  Left Heart Cath;  Surgeon: Mahin Dominique MD;  Location: [facility] CATH INVASIVE LOCATION;  Service: Cardiovascular   • CARDIAC CATHETERIZATION N/A 2019    Procedure: Left ventriculography;  Surgeon: Mahin Dominique MD;  Location: [facility] CATH INVASIVE LOCATION;  Service: Cardiovascular   • CARDIAC CATHETERIZATION N/A 2019    Procedure: Stent PENG coronary;  Surgeon: Mahin Dominique MD;  Location: [facility] CATH INVASIVE LOCATION;  Service: Cardiovascular   • COLONOSCOPY       Outpatient Medications Prior to Visit   Medication Sig Dispense Refill   • amLODIPine (NORVASC) 5 MG tablet Take 5 mg by mouth Daily.     • aspirin 81 MG EC tablet Take 81 mg by mouth daily.     • atorvastatin (LIPITOR) 40 MG tablet Take 1 tablet by mouth Every Night. 30 tablet 11   • Flavoring Agent (PEPPERMINT FLAVOR) oil Take 1 capsule by mouth daily.     • levothyroxine (SYNTHROID, LEVOTHROID) 75 MCG tablet Take 75 mcg by mouth Daily.     • Omega-3 Fatty Acids (FISH OIL) 1200 MG capsule capsule Take 1,200 mg by mouth Daily.     • ticagrelor (BRILINTA) 90 MG tablet tablet Take 1 tablet by mouth 2 (Two) Times a Day. 60 tablet 11   • atenolol (TENORMIN) 25 MG tablet Take 25 mg by mouth daily.       No facility-administered medications prior to visit.        Allergies as of 2019   • (No Known Allergies)     Social History     Socioeconomic History   • Marital status:      Spouse name: Not on file   • Number of children: Not on file   • Years of education: Not on file   • Highest education level: Not on file   Tobacco Use   • Smoking status: Current Some Day Smoker     Packs/day: 0.25     Years: 15.00     Pack years: 3.75     Types: Cigarettes     Last attempt to quit: 1935     Years since quittin.7   • Smokeless tobacco: Never Used   • Tobacco comment: caffeine daily - currently  cigs a week   Substance and Sexual Activity   • Alcohol use: Yes     Comment: SOCIALLY   • Drug use: No   • Sexual activity:  "Defer     Family History   Problem Relation Age of Onset   • Hyperlipidemia Mother    • Glaucoma Mother    • Diabetes Father    • Lung cancer Father    • Diabetes type I Father    • Cancer Father    • Uterine cancer Sister    • Skin cancer Sister    • Cancer Sister    • Cancer Brother    • Heart disease Brother    • Heart attack Brother    • No Known Problems Maternal Grandmother    • Heart disease Maternal Grandfather    • No Known Problems Paternal Grandmother    • No Known Problems Paternal Grandfather    • Cancer Sister          Review of Systems   Constitution: Negative for chills, fever, malaise/fatigue, weight gain and weight loss.   HENT: Negative for ear pain, hearing loss, nosebleeds and sore throat.    Eyes: Negative for blurred vision, double vision, redness, vision loss in left eye and vision loss in right eye.   Cardiovascular:        SEE HPI   Respiratory: Positive for shortness of breath. Negative for cough, snoring and wheezing.    Endocrine: Negative for cold intolerance and heat intolerance.   Skin: Negative for itching, rash and suspicious lesions.   Musculoskeletal: Negative for joint pain, joint swelling and myalgias.   Gastrointestinal: Negative for abdominal pain, diarrhea, hematemesis, melena, nausea and vomiting.   Genitourinary: Negative for dysuria, frequency and hematuria.   Neurological: Negative for dizziness, headaches, numbness, paresthesias and seizures.   Psychiatric/Behavioral: Negative for altered mental status and depression. The patient is not nervous/anxious.           Objective:     Vitals:    08/27/19 1044   BP: 130/90   BP Location: Right arm   Pulse: 50   Weight: 99 kg (218 lb 3.2 oz)   Height: 180.3 cm (71\")     Body mass index is 30.43 kg/m².      PHYSICAL EXAM:  Physical Exam   Constitutional: He is oriented to person, place, and time. He appears well-developed and well-nourished. No distress.   HENT:   Head: Normocephalic and atraumatic.   Eyes: Pupils are equal, " round, and reactive to light.   Neck: Neck supple. No JVD present. Carotid bruit is not present. No tracheal deviation present.   Cardiovascular: Normal rate, regular rhythm, normal heart sounds and intact distal pulses. Exam reveals no gallop and no friction rub.   No murmur heard.  Pulses:       Radial pulses are 2+ on the right side, and 2+ on the left side.        Posterior tibial pulses are 2+ on the right side, and 2+ on the left side.   Pulmonary/Chest: Effort normal and breath sounds normal. No respiratory distress. He has no wheezes. He has no rales.   Abdominal: Soft. Bowel sounds are normal. He exhibits no distension. There is no tenderness.   Musculoskeletal: He exhibits no edema, tenderness or deformity.   Neurological: He is alert and oriented to person, place, and time.   Skin: Skin is warm and dry. No rash noted. He is not diaphoretic. No erythema.   Right wrist where heart catheterization was done appears within normal limits.  No redness, bruising, hematoma, or signs/symptoms of infection or complication.  Pulses normal distally and proximally.   Psychiatric: He has a normal mood and affect. His behavior is normal. Judgment normal.           ECG 12 Lead  Date/Time: 8/27/2019 10:52 AM  Performed by: Johana Hernández DNP, KITA  Authorized by: Johana Hernández DNP, KITA   Comparison: compared with previous ECG from 8/21/2019  Rhythm: sinus rhythm  Rate: bradycardic  BPM: 50  Other findings: non-specific ST-T wave changes  Comments: No significant changes from previous ECG.              Assessment:       Diagnosis Plan   1. Coronary artery disease involving native coronary artery of native heart without angina pectoris  Treadmill Stress Test - Rehab Protocol    metoprolol succinate XL (TOPROL-XL) 25 MG 24 hr tablet    Comprehensive Metabolic Panel    Lipid Panel   2. Essential hypertension  metoprolol succinate XL (TOPROL-XL) 25 MG 24 hr tablet    Comprehensive Metabolic Panel   3. Mixed  hyperlipidemia  Lipid Panel   4. S/P angioplasty with stent  Treadmill Stress Test - Rehab Protocol    Comprehensive Metabolic Panel    Lipid Panel         Plan:     1. Coronary artery disease: Status post drug-eluting stent placement to proximal and mid LAD.  Dual antiplatelet therapy recommended for at least 1 year.  Medical management recommended for chronic total occlusion of RCA.  Will change from atenolol to metoprolol.  Patient to call to set up cardiac rehab.  Will get rehab stress test done this week.  He will notify the office if he develops any worsening chest burning with exertion or any other exertional issues or concerns.  Will further titrate up metoprolol as long as heart rate remains stable for better symptom control.  Consider Imdur if he continues to have chest burning with chronic total occlusion of the RCA after starting cardiac rehab.    Coronary Artery Disease  Plan  • Lifestyle modifications discussed include adhering to a heart healthy diet, avoidance of tobacco products, regular exercise and regular monitoring of cholesterol and blood pressure    Subjective - Objective  • There has been a previous stent procedure using PENG on or around 8/20/2019  • Current antiplatelet therapy includes aspirin 81 mg and ticagrelor 90 mg      2. Hypertension: Diastolic blood pressure slightly elevated in the office today.  Patient to continue to monitor.  3. Hyperlipidemia: On statin therapy.  Will recheck a lipid panel and CMP fasting prior to his 10/11 follow-up visit.  4. Borderline diabetes: Managed by outside provider.  5. Hypothyroidism: Managed by outside provider.  On levothyroxine.  6. Nicotine dependence: Patient prefers to cut back and then cut out.  He does not want to use nicotine patches.  He is down to 6 cigarettes a day.  Recommended that he continue to cut back on his smoking until he cuts out completely.  Recommended trying to be a non-smoker by his October follow-up visit.    Patient to  keep 10/11/2019 follow-up with Dr. Sorto as scheduled or follow-up sooner if needed for any new, recurrent, or worsening symptoms or other problems/concerns.           Your medication list           Accurate as of 8/27/19 11:25 AM. If you have any questions, ask your nurse or doctor.               START taking these medications      Instructions Last Dose Given Next Dose Due   metoprolol succinate XL 25 MG 24 hr tablet  Commonly known as:  TOPROL-XL  Started by:  Johana Hernández DNP, APRN      Take 1 tablet by mouth Daily.          CONTINUE taking these medications      Instructions Last Dose Given Next Dose Due   amLODIPine 5 MG tablet  Commonly known as:  NORVASC      Take 5 mg by mouth Daily.       aspirin 81 MG EC tablet      Take 81 mg by mouth daily.       atorvastatin 40 MG tablet  Commonly known as:  LIPITOR      Take 1 tablet by mouth Every Night.       fish oil 1200 MG capsule capsule      Take 1,200 mg by mouth Daily.       levothyroxine 75 MCG tablet  Commonly known as:  SYNTHROID, LEVOTHROID      Take 75 mcg by mouth Daily.       Peppermint Flavor oil      Take 1 capsule by mouth daily.       ticagrelor 90 MG tablet tablet  Commonly known as:  BRILINTA      Take 1 tablet by mouth 2 (Two) Times a Day.          STOP taking these medications    atenolol 25 MG tablet  Commonly known as:  TENORMIN  Stopped by:  Johana Hernández DNP, APRN              Where to Get Your Medications      These medications were sent to [facility] [address] [place] [address], [place] - [address] - [phone]  - [phone] FX  [address] [facility] [place] [address]    Phone:  [phone]   · metoprolol succinate XL 25 MG 24 hr tablet         I did not stop or change the above medications with the exception of stopping atenolol and starting metoprolol, as discussed above.  Patient's medication list was updated to reflect medications they are currently taking including medication changes made by other  providers.        Thanks,    Johana Hernández DNP, APRN  08/27/2019         Dictated utilizing Dragon dictation

## 2019-08-28 ENCOUNTER — HOSPITAL ENCOUNTER (OUTPATIENT)
Dept: CARDIOLOGY | Facility: HOSPITAL | Age: 59
Discharge: HOME | End: 2019-08-28
Admitting: NURSE PRACTITIONER

## 2019-08-28 DIAGNOSIS — Z95.820 S/P ANGIOPLASTY WITH STENT: ICD-10-CM

## 2019-08-28 DIAGNOSIS — I25.10 CORONARY ARTERY DISEASE INVOLVING NATIVE CORONARY ARTERY OF NATIVE HEART WITHOUT ANGINA PECTORIS: ICD-10-CM

## 2019-08-28 LAB
BH CV STRESS BP STAGE 1: NORMAL
BH CV STRESS BP STAGE 2: NORMAL
BH CV STRESS DURATION MIN STAGE 1: 3
BH CV STRESS DURATION MIN STAGE 2: 3
BH CV STRESS DURATION SEC STAGE 1: 0
BH CV STRESS DURATION SEC STAGE 2: 0
BH CV STRESS GRADE STAGE 1: 0
BH CV STRESS GRADE STAGE 2: 5
BH CV STRESS HR STAGE 1: 91
BH CV STRESS HR STAGE 2: 104
BH CV STRESS METS STAGE 1: 2.3
BH CV STRESS METS STAGE 2: 3.5
BH CV STRESS PROTOCOL 1: NORMAL
BH CV STRESS RECOVERY BP: NORMAL MMHG
BH CV STRESS RECOVERY HR: 81 BPM
BH CV STRESS SPEED STAGE 1: 1.7
BH CV STRESS SPEED STAGE 2: 1.7
BH CV STRESS STAGE 1: 1
BH CV STRESS STAGE 2: 2
MAXIMAL PREDICTED HEART RATE: 164 BPM
PERCENT MAX PREDICTED HR: 63.41 %
STRESS BASELINE BP: NORMAL MMHG
STRESS BASELINE HR: 73 BPM
STRESS PERCENT HR: 75 %
STRESS POST ESTIMATED WORKLOAD: 3.5 METS
STRESS POST EXERCISE DUR MIN: 6 MIN
STRESS POST PEAK BP: NORMAL MMHG
STRESS POST PEAK HR: 104 BPM
STRESS TARGET HR: 139 BPM

## 2019-08-28 PROCEDURE — 93018 CV STRESS TEST I&R ONLY: CPT | Performed by: INTERNAL MEDICINE

## 2019-08-28 PROCEDURE — 93016 CV STRESS TEST SUPVJ ONLY: CPT | Performed by: INTERNAL MEDICINE

## 2019-08-28 PROCEDURE — 93017 CV STRESS TEST TRACING ONLY: CPT

## 2019-08-29 ENCOUNTER — TELEPHONE (OUTPATIENT)
Dept: CARDIOLOGY | Facility: CLINIC | Age: 59
End: 2019-08-29

## 2019-08-29 NOTE — PROGRESS NOTES
Please let patient know that his rehab stress test did not show any signs of tightly blocked arteries.  Recommend continuing to cut back on smoking until he cuts out completely.  Recommend starting cardiac rehab.  Have him monitor heart rate and blood pressure on the metoprolol and call with readings in 1 to 2 weeks.  Plan of care discussed with Dr. Sorto.  If he continues to have slight burning in chest with exercise we may need to increase metoprolol dosage if blood pressure and heart rate remained stable.  If heart rate will not tolerate increased metoprolol dosage or if he continues to have symptoms despite metoprolol or other medications we can try to maximize medical therapy for chronic right coronary artery occlusion.  However if he has any new or worsening symptoms he is to notify the office right away or go to the ER if they are severe.  He is to keep his October follow-up with Dr. Sorto as scheduled or follow-up sooner if needed for any new, recurrent, or worsening symptoms  or other problems/concerns.

## 2019-08-29 NOTE — TELEPHONE ENCOUNTER
Notified patient of results. Patient verbalized understanding.    Lima Osborn, RN  [place] [facility] [place]

## 2019-08-29 NOTE — TELEPHONE ENCOUNTER
08/29/19  3:41 PM  Hector Ortega  1960  Home Phone [phone]   Mobile [phone]       Lima,    Mr. Ortega just called. He said you left him a message and was calling you back about the stress test results. They accidentally sent him to me. If you want me to call him the results, I will. Just wasn't sure if you wanted to talk to him.    Lima Osborn, RN  Triage RN [facility] [place]

## 2019-08-29 NOTE — TELEPHONE ENCOUNTER
Please see results note for rehab stress test.  Please call patient back with results--  this was sent to triage basket earlier today.  I did not try to call him today but may be another triage nurse did.    Please let me know if he has any additional questions or concerns.

## 2019-09-18 DIAGNOSIS — I10 ESSENTIAL HYPERTENSION: ICD-10-CM

## 2019-09-18 DIAGNOSIS — I25.10 CORONARY ARTERY DISEASE INVOLVING NATIVE CORONARY ARTERY OF NATIVE HEART WITHOUT ANGINA PECTORIS: ICD-10-CM

## 2019-09-18 RX ORDER — METOPROLOL SUCCINATE 25 MG/1
TABLET, EXTENDED RELEASE ORAL
Qty: 30 TABLET | Refills: 5 | Status: SHIPPED | OUTPATIENT
Start: 2019-09-18 | End: 2020-03-27

## 2019-10-11 ENCOUNTER — OFFICE VISIT (OUTPATIENT)
Dept: CARDIOLOGY | Facility: CLINIC | Age: 59
End: 2019-10-11

## 2019-10-11 ENCOUNTER — LAB (OUTPATIENT)
Dept: LAB | Facility: HOSPITAL | Age: 59
End: 2019-10-11

## 2019-10-11 VITALS
HEART RATE: 54 BPM | DIASTOLIC BLOOD PRESSURE: 84 MMHG | BODY MASS INDEX: 31.05 KG/M2 | WEIGHT: 221.8 LBS | SYSTOLIC BLOOD PRESSURE: 122 MMHG | HEIGHT: 71 IN

## 2019-10-11 DIAGNOSIS — I10 ESSENTIAL HYPERTENSION: ICD-10-CM

## 2019-10-11 DIAGNOSIS — I25.10 CORONARY ARTERY DISEASE INVOLVING NATIVE CORONARY ARTERY OF NATIVE HEART WITHOUT ANGINA PECTORIS: ICD-10-CM

## 2019-10-11 DIAGNOSIS — E78.5 HYPERLIPIDEMIA, UNSPECIFIED HYPERLIPIDEMIA TYPE: ICD-10-CM

## 2019-10-11 DIAGNOSIS — Z72.0 TOBACCO USE: ICD-10-CM

## 2019-10-11 DIAGNOSIS — I25.10 CORONARY ARTERY DISEASE INVOLVING NATIVE CORONARY ARTERY OF NATIVE HEART WITHOUT ANGINA PECTORIS: Primary | ICD-10-CM

## 2019-10-11 DIAGNOSIS — E78.2 MIXED HYPERLIPIDEMIA: ICD-10-CM

## 2019-10-11 DIAGNOSIS — Z95.820 S/P ANGIOPLASTY WITH STENT: ICD-10-CM

## 2019-10-11 LAB
ALBUMIN SERPL-MCNC: 4.6 G/DL (ref 3.5–5.2)
ALBUMIN/GLOB SERPL: 1.6 G/DL
ALP SERPL-CCNC: 72 U/L (ref 39–117)
ALT SERPL W P-5'-P-CCNC: 56 U/L (ref 1–41)
ANION GAP SERPL CALCULATED.3IONS-SCNC: 11.8 MMOL/L (ref 5–15)
AST SERPL-CCNC: 23 U/L (ref 1–40)
BILIRUB SERPL-MCNC: 0.5 MG/DL (ref 0.2–1.2)
BUN BLD-MCNC: 16 MG/DL (ref 6–20)
BUN/CREAT SERPL: 15.4 (ref 7–25)
CALCIUM SPEC-SCNC: 8.9 MG/DL (ref 8.6–10.5)
CHLORIDE SERPL-SCNC: 104 MMOL/L (ref 98–107)
CHOLEST SERPL-MCNC: 103 MG/DL (ref 0–200)
CO2 SERPL-SCNC: 23.2 MMOL/L (ref 22–29)
CREAT BLD-MCNC: 1.04 MG/DL (ref 0.76–1.27)
GFR SERPL CREATININE-BSD FRML MDRD: 74 ML/MIN/1.73
GLOBULIN UR ELPH-MCNC: 2.9 GM/DL
GLUCOSE BLD-MCNC: 116 MG/DL (ref 65–99)
HDLC SERPL-MCNC: 27 MG/DL (ref 40–60)
LDLC SERPL CALC-MCNC: 41 MG/DL (ref 0–100)
LDLC/HDLC SERPL: 1.53 {RATIO}
POTASSIUM BLD-SCNC: 4.8 MMOL/L (ref 3.5–5.2)
PROT SERPL-MCNC: 7.5 G/DL (ref 6–8.5)
SODIUM BLD-SCNC: 139 MMOL/L (ref 136–145)
TRIGL SERPL-MCNC: 174 MG/DL (ref 0–150)
VLDLC SERPL-MCNC: 34.8 MG/DL (ref 5–40)

## 2019-10-11 PROCEDURE — 80053 COMPREHEN METABOLIC PANEL: CPT

## 2019-10-11 PROCEDURE — 36415 COLL VENOUS BLD VENIPUNCTURE: CPT

## 2019-10-11 PROCEDURE — 99214 OFFICE O/P EST MOD 30 MIN: CPT | Performed by: INTERNAL MEDICINE

## 2019-10-11 PROCEDURE — 80061 LIPID PANEL: CPT

## 2019-10-11 PROCEDURE — 93000 ELECTROCARDIOGRAM COMPLETE: CPT | Performed by: INTERNAL MEDICINE

## 2019-10-11 NOTE — PROGRESS NOTES
PATIENTINFORMATION    Date of Office Visit: 10/11/19  Encounter Provider: Bettina Sorto MD  Place of Service: [facility] CARDIOLOGY  Patient Name: Hector Ortega  : 1960    Subjective:         Patient ID: Hector Ortega is a 58 y.o. male.    This is a pleasant gentleman with a history of hyperlipidemia, premature coronary artery disease in his brother, and a long history of smoking.  I saw him in 2019 when he was complaining of chest pain with shortness of breath with exertion.  He had a heart catheterization which showed a 95% mid LAD stenosis with normal LV function.  He had a drug-eluting stents to the LAD.    He comes in today for followup.  He denies palpitations, shortness of breath, edema or chest pain.  He has not been exercising on a regular basis.  He does not have any symptoms with exertion.        Review of Systems   Constitution: Negative for fever, malaise/fatigue, weight gain and weight loss.   HENT: Negative for ear pain, hearing loss, nosebleeds and sore throat.    Eyes: Negative for double vision, pain, vision loss in left eye and vision loss in right eye.   Cardiovascular:        See history of present illness.   Respiratory: Negative for cough, shortness of breath, sleep disturbances due to breathing, snoring and wheezing.    Endocrine: Negative for cold intolerance, heat intolerance and polyuria.   Skin: Negative for itching, poor wound healing and rash.   Musculoskeletal: Negative for joint pain, joint swelling and myalgias.   Gastrointestinal: Negative for abdominal pain, diarrhea, hematochezia, nausea and vomiting.   Genitourinary: Negative for hematuria and hesitancy.   Neurological: Positive for excessive daytime sleepiness, dizziness and light-headedness. Negative for numbness, paresthesias and seizures.   Psychiatric/Behavioral: Negative for depression. The patient is not nervous/anxious.            ECG 12 Lead  Date/Time: 10/11/2019 11:19  "AM  Performed by: Bettina Sorto MD  Authorized by: Bettina Sorto MD   Comparison: compared with previous ECG from 8/14/2019  Similar to previous ECG  Rhythm: sinus rhythm  BPM: 54  Conduction: conduction normal  ST Segments: ST segments normal  T Waves: T waves normal    Clinical impression: normal ECG               Objective:     /84 (BP Location: Left arm, Patient Position: Sitting, Cuff Size: Large Adult)   Pulse 54   Ht 180.3 cm (71\")   Wt 101 kg (221 lb 12.8 oz)   BMI 30.93 kg/m²  Body mass index is 30.93 kg/m².     Physical Exam   Constitutional: He appears well-developed.   HENT:   Head: Normocephalic and atraumatic.   Eyes: Conjunctivae and lids are normal. Pupils are equal, round, and reactive to light. Lids are everted and swept, no foreign bodies found.   Neck: Normal range of motion. No JVD present. Carotid bruit is not present. No tracheal deviation present. No thyroid mass present.   Cardiovascular: Normal rate, regular rhythm and normal heart sounds.   Pulses:       Dorsalis pedis pulses are 2+ on the right side, and 2+ on the left side.   Pulmonary/Chest: Effort normal and breath sounds normal.   Abdominal: Normal appearance and bowel sounds are normal.   Musculoskeletal: Normal range of motion.   Neurological: He is alert. He has normal strength.   Skin: Skin is warm, dry and intact.   Psychiatric: He has a normal mood and affect. His behavior is normal.   Vitals reviewed.      Lab Review: I reviewed his labs from his primary doctor.  These were performed in July 2019 total cholesterol 179, triglycerides 161, HDL 21 and       Assessment/Plan:       1.  Coronary artery disease with an abnormal stress test suggesting ischemia in the inferior wall.  This was done at [facility] in July 2019.  Heart catheterization showed a chronic total occlusion of the right and an LAD 95% stenosis which was stented.  He has not had any recurrent anginal symptoms since his heart cath.  He has been " feeling well.  2.  Borderline diabetes.  3.  Systemic hypertension.  His blood pressures controlled.  4.  Hyperlipidemia.  I have reviewed his lipid panel and it is not great.  If he has coronary disease, then he will need statin therapy.  5.  Smoking.  He is a heavy smoker who is now down to about a pack a day.  I have encouraged him to discontinue smoking.    6.  Brother with a heart attack in his mid 50s.      Hector Ortega is a current cigarettes user.  He currently smokes 1 pack of cigarettes per day for a duration of 40 years. I have educated him on the risk of diseases from using tobacco products such as heart diease.     I advised him to quit and he is not willing to quit.  He is contemplating quitting.    I spent >10 minutes counseling the patient.    He will follow-up in a nurse practitioner next summer and discuss coming off aspirin and decreasing the Brilinta dose.    Orders Placed This Encounter   Procedures   • ECG 12 Lead     This order was created via procedure documentation        Discharge Medications           Accurate as of 10/11/19  2:49 PM. If you have any questions, ask your nurse or doctor.               Continue These Medications      Instructions Start Date   amLODIPine 5 MG tablet  Commonly known as:  NORVASC   5 mg, Oral, Daily      aspirin 81 MG EC tablet   81 mg, Oral, Daily      atorvastatin 40 MG tablet  Commonly known as:  LIPITOR   40 mg, Oral, Nightly      fish oil 1200 MG capsule capsule   1,200 mg, Oral, Daily      levothyroxine 75 MCG tablet  Commonly known as:  SYNTHROID, LEVOTHROID   75 mcg, Oral, Daily      metoprolol succinate XL 25 MG 24 hr tablet  Commonly known as:  TOPROL-XL   TAKE ONE TABLET BY MOUTH DAILY      Peppermint Flavor oil   1 capsule, Oral, Daily      ticagrelor 90 MG tablet tablet  Commonly known as:  BRILINTA   90 mg, Oral, 2 Times Daily                    Bettina Sorto MD  10/11/19  2:49 PM

## 2020-03-27 DIAGNOSIS — I25.10 CORONARY ARTERY DISEASE INVOLVING NATIVE CORONARY ARTERY OF NATIVE HEART WITHOUT ANGINA PECTORIS: ICD-10-CM

## 2020-03-27 DIAGNOSIS — I10 ESSENTIAL HYPERTENSION: ICD-10-CM

## 2020-03-27 RX ORDER — METOPROLOL SUCCINATE 25 MG/1
TABLET, EXTENDED RELEASE ORAL
Qty: 30 TABLET | Refills: 4 | Status: SHIPPED | OUTPATIENT
Start: 2020-03-27 | End: 2020-08-20

## 2020-08-14 ENCOUNTER — OFFICE VISIT (OUTPATIENT)
Dept: CARDIOLOGY | Facility: CLINIC | Age: 60
End: 2020-08-14

## 2020-08-14 ENCOUNTER — HOSPITAL ENCOUNTER (OUTPATIENT)
Dept: CARDIOLOGY | Facility: HOSPITAL | Age: 60
Discharge: HOME | End: 2020-08-14
Admitting: NURSE PRACTITIONER

## 2020-08-14 ENCOUNTER — TELEPHONE (OUTPATIENT)
Dept: CARDIOLOGY | Facility: CLINIC | Age: 60
End: 2020-08-14

## 2020-08-14 VITALS
HEART RATE: 56 BPM | SYSTOLIC BLOOD PRESSURE: 134 MMHG | DIASTOLIC BLOOD PRESSURE: 90 MMHG | HEIGHT: 71 IN | WEIGHT: 226.4 LBS | BODY MASS INDEX: 31.69 KG/M2

## 2020-08-14 DIAGNOSIS — I25.10 CORONARY ARTERY DISEASE INVOLVING NATIVE CORONARY ARTERY OF NATIVE HEART WITHOUT ANGINA PECTORIS: Primary | ICD-10-CM

## 2020-08-14 DIAGNOSIS — G47.19 OTHER HYPERSOMNIA: ICD-10-CM

## 2020-08-14 DIAGNOSIS — E78.2 MIXED HYPERLIPIDEMIA: ICD-10-CM

## 2020-08-14 DIAGNOSIS — R29.818 SUSPECTED SLEEP APNEA: ICD-10-CM

## 2020-08-14 DIAGNOSIS — I10 ESSENTIAL HYPERTENSION: ICD-10-CM

## 2020-08-14 LAB
ALBUMIN SERPL-MCNC: 4.5 G/DL (ref 3.5–5.2)
ALBUMIN/GLOB SERPL: 1.5 G/DL
ALP SERPL-CCNC: 71 U/L (ref 39–117)
ALT SERPL W P-5'-P-CCNC: 57 U/L (ref 1–41)
ANION GAP SERPL CALCULATED.3IONS-SCNC: 9.6 MMOL/L (ref 5–15)
AST SERPL-CCNC: 32 U/L (ref 1–40)
BILIRUB SERPL-MCNC: 0.7 MG/DL (ref 0–1.2)
BUN SERPL-MCNC: 13 MG/DL (ref 6–20)
BUN/CREAT SERPL: 14.1 (ref 7–25)
CALCIUM SPEC-SCNC: 9.7 MG/DL (ref 8.6–10.5)
CHLORIDE SERPL-SCNC: 103 MMOL/L (ref 98–107)
CO2 SERPL-SCNC: 22.4 MMOL/L (ref 22–29)
CREAT SERPL-MCNC: 0.92 MG/DL (ref 0.76–1.27)
GFR SERPL CREATININE-BSD FRML MDRD: 85 ML/MIN/1.73
GLOBULIN UR ELPH-MCNC: 3 GM/DL
GLUCOSE SERPL-MCNC: 119 MG/DL (ref 65–99)
POTASSIUM SERPL-SCNC: 4.4 MMOL/L (ref 3.5–5.2)
PROT SERPL-MCNC: 7.5 G/DL (ref 6–8.5)
SODIUM SERPL-SCNC: 135 MMOL/L (ref 136–145)

## 2020-08-14 PROCEDURE — 99214 OFFICE O/P EST MOD 30 MIN: CPT | Performed by: NURSE PRACTITIONER

## 2020-08-14 PROCEDURE — 80053 COMPREHEN METABOLIC PANEL: CPT | Performed by: NURSE PRACTITIONER

## 2020-08-14 PROCEDURE — 93000 ELECTROCARDIOGRAM COMPLETE: CPT | Performed by: NURSE PRACTITIONER

## 2020-08-14 PROCEDURE — 36415 COLL VENOUS BLD VENIPUNCTURE: CPT

## 2020-08-14 NOTE — PROGRESS NOTES
Date of Office Visit: 2020  Encounter Provider: Johana Hernández, BRANDON, APRN  Place of Service: [facility] CARDIOLOGY  Patient Name: Hector Ortega  :1960        Subjective:     Chief Complaint:  Follow-up, coronary artery disease, hypertension      History of Present Illness:  Hector Ortega is a 59 y.o. male patient of Dr. Sorto.  I am seeing this patient in the office today and I have reviewed his records.     Patient has a history of coronary artery disease status post stent placement, hypertension, hyperlipidemia, smoking, borderline diabetes, family history of premature coronary artery disease.     Patient was seen in office 2019 he reported a dull burning pain in his chest that occurred with or with being active.  Not associated with diaphoresis or shortness of breath.  Rest.  Cardiologist in [place] who recommended nuclear stress test which was done 2019 showing cessation was recommended however.  Patient continued to have exertional chest burning symptoms that improved with rest while seen by Dr. Sorto.  Heart catheterization was recommended as well as smoking cessation.  Patient underwent heart catheterization 19.  This showed normal left ventricular size and systolic function with EF of 55%.  Mid LAD had a hazy calcified 95% stenosis.  Small caliber diagonal branch has 60% stenosis.  Circumflex had some luminal irregularities.  RCA was a large caliber dominant vessel with chronic occlusion in the proximal segment with bridging collaterals.  Patient underwent drug-eluting stent placement to the proximal to mid LAD with overlapping Xience drug-eluting stents.  Dual antiplatelet therapy recommended for at least one year as well as medical management of RCA chronic total occlusion. Rehab stress test 2019 showed no evidence of ischemia.       Patient presents to office today for follow-up appointment.  Patient reports he is doing well overall since last  visit.  Unfortunately he is not exercising.  He does get some shortness of breath with exertion though this is not new or worsening since last visit.  He denies any chest pain, discomfort, or burning.  His anginal equivalent prior to the stent placement was a chest burning with exercise and he has not had any recurrence of this since the stent was placed.  He gets some occasional lightheadedness with position changes.  Denies any palpitations, racing heartbeat sensation, lower extremity edema, syncope, falls, or abnormal bleeding.  Unfortunately he is smoking 1 pack/day of cigarettes and we discussed smoking cessation recommendations in detail.  He has chronic fatigue with a history of snoring and morning fatigue and recommended sleep apnea testing and he is amenable to home sleep study.  We also reviewed his recent labs from primary care provider including CMP, lipid panel, and A1c showing prediabetes.  Discussed healthy diet lifestyle and risk factor modification.          Past Medical History:   Diagnosis Date   • Borderline diabetic    • CAD (coronary artery disease)    • Diverticulitis of colon    • Hypertension    • Mixed hyperlipidemia    • Precordial pain    • Thyroid nodule      Past Surgical History:   Procedure Laterality Date   • CARDIAC CATHETERIZATION     • CARDIAC CATHETERIZATION N/A 8/20/2019    Procedure: Coronary angiography;  Surgeon: Mahin Dominique MD;  Location:  [facility];  Service: Cardiovascular   • CARDIAC CATHETERIZATION N/A 8/20/2019    Procedure: Left Heart Cath;  Surgeon: Mahin Dominique MD;  Location:  [facility];  Service: Cardiovascular   • CARDIAC CATHETERIZATION N/A 8/20/2019    Procedure: Left ventriculography;  Surgeon: Mahin Doimnique MD;  Location:  [facility];  Service: Cardiovascular   • CARDIAC CATHETERIZATION N/A 8/20/2019    Procedure: Stent PENG coronary;  Surgeon: Mahin Dominique MD;   Location: [facility] LOCATION;  Service: Cardiovascular   • COLONOSCOPY       Outpatient Medications Prior to Visit   Medication Sig Dispense Refill   • amLODIPine (NORVASC) 5 MG tablet Take 5 mg by mouth Daily.     • aspirin 81 MG EC tablet Take 81 mg by mouth daily.     • atorvastatin (LIPITOR) 40 MG tablet Take 1 tablet by mouth Every Night. (Patient taking differently: Take 20 mg by mouth Every Night.) 30 tablet 11   • Flavoring Agent (PEPPERMINT FLAVOR) oil Take 1 capsule by mouth daily.     • levothyroxine (SYNTHROID, LEVOTHROID) 75 MCG tablet Take 75 mcg by mouth Daily.     • metoprolol succinate XL (TOPROL-XL) 25 MG 24 hr tablet TAKE ONE TABLET BY MOUTH DAILY 30 tablet 4   • Omega-3 Fatty Acids (FISH OIL) 1200 MG capsule capsule Take 1,200 mg by mouth Daily.     • ticagrelor (BRILINTA) 90 MG tablet tablet Take 1 tablet by mouth 2 (Two) Times a Day. 60 tablet 11     No facility-administered medications prior to visit.        Allergies as of 2020   • (No Known Allergies)     Social History     Socioeconomic History   • Marital status:      Spouse name: Not on file   • Number of children: Not on file   • Years of education: Not on file   • Highest education level: Not on file   Tobacco Use   • Smoking status: Current Every Day Smoker     Packs/day: 1.00     Years: 15.00     Pack years: 15.00     Types: Cigarettes     Last attempt to quit: 1935     Years since quittin.6   • Smokeless tobacco: Never Used   • Tobacco comment: caffeine daily - currently  cigs a week   Substance and Sexual Activity   • Alcohol use: Yes     Comment: SOCIALLY   • Drug use: No   • Sexual activity: Defer     Family History   Problem Relation Age of Onset   • Hyperlipidemia Mother    • Glaucoma Mother    • Diabetes Father    • Lung cancer Father    • Diabetes type I Father    • Cancer Father    • Uterine cancer Sister    • Skin cancer Sister    • Cancer Sister    • Cancer Brother    • Heart disease Brother     "  • Heart attack Brother    • No Known Problems Maternal Grandmother    • Heart disease Maternal Grandfather    • No Known Problems Paternal Grandmother    • No Known Problems Paternal Grandfather    • Cancer Sister        Review of Systems   Constitution: Positive for malaise/fatigue. Negative for chills, fever, night sweats, weight gain and weight loss.   HENT: Negative for ear pain, hearing loss, nosebleeds and sore throat.    Eyes: Negative for blurred vision, double vision, redness, vision loss in left eye, vision loss in right eye and visual disturbance.   Cardiovascular:        SEE HPI   Respiratory: Positive for shortness of breath and wheezing. Negative for cough and snoring.    Endocrine: Negative for cold intolerance and heat intolerance.   Skin: Negative for itching, rash and suspicious lesions.   Musculoskeletal: Negative for joint pain, joint swelling and myalgias.   Gastrointestinal: Negative for abdominal pain, diarrhea, hematemesis, melena, nausea and vomiting.   Genitourinary: Negative for dysuria, frequency and hematuria.   Neurological: Positive for dizziness. Negative for headaches, numbness, paresthesias and seizures.   Psychiatric/Behavioral: Negative for altered mental status and depression. The patient is not nervous/anxious.           Objective:     Vitals:    08/14/20 1025   BP: 134/90   BP Location: Right arm   Pulse: 56   Weight: 103 kg (226 lb 6.4 oz)   Height: 180.3 cm (71\")     Body mass index is 31.58 kg/m².      PHYSICAL EXAM:  Physical Exam   Constitutional: He is oriented to person, place, and time. He appears well-developed and well-nourished. No distress.   HENT:   Head: Normocephalic and atraumatic.   Eyes: Pupils are equal, round, and reactive to light.   Neck: Neck supple. No JVD present. Carotid bruit is not present.   Cardiovascular: Normal rate, regular rhythm, normal heart sounds and intact distal pulses. Exam reveals no gallop and no friction rub.   No murmur " heard.  Pulses:       Radial pulses are 2+ on the right side, and 2+ on the left side.        Posterior tibial pulses are 2+ on the right side, and 2+ on the left side.   Pulmonary/Chest: Effort normal and breath sounds normal. No respiratory distress. He has no wheezes. He has no rales.   Abdominal: Soft. Bowel sounds are normal. He exhibits no distension.   Musculoskeletal: He exhibits no edema, tenderness or deformity.   Neurological: He is alert and oriented to person, place, and time.   Skin: Skin is warm and dry. No rash noted. He is not diaphoretic. No erythema.   Psychiatric: He has a normal mood and affect. His behavior is normal. Judgment normal.           ECG 12 Lead  Date/Time: 8/14/2020 10:35 AM  Performed by: Johana Hernández DNP, APRN  Authorized by: Johana Hernández DNP, APRN   Comparison: compared with previous ECG from 10/11/2019  Similar to previous ECG  Rhythm: sinus rhythm  Rate: bradycardic  BPM: 56  Comments: No significant changes from previous EKG              Assessment:       Diagnosis Plan   1. Coronary artery disease involving native coronary artery of native heart without angina pectoris     2. Essential hypertension     3. Mixed hyperlipidemia  Comprehensive Metabolic Panel   4. Suspected sleep apnea  Home Sleep Study   5. Other hypersomnia  Home Sleep Study         Plan:     1. Coronary artery disease: with abnormal stress test 7/2019 at [facility] and subsequent cath 8/2019 showing chronic total occlusion of proximal RCA filling via collaterals as well as 95% stenosis of LAD for which he received overlapping stents.  DAPT was recommended for at least 1 year.  At this point we discussed benefits versus risks of continuing dual antiplatelet therapy for another 6 months or going ahead and stopping 1 of the antiplatelet agents.  He is tolerating the dual antiplatelet well without any abnormal bleeding or belly issues.  As he continues to smoke and is not currently exercising we  discussed cautiously continuing this another 6 months while he works on smoking cessation and easing into a more regular exercise routine.  We will have him follow-up with Dr. Sorto in 6 months and based on how he is doing we will look at possibly discontinuing the aspirin and continuing the Brilinta as the Brilinta is very and portable for him with his insurance.  He is going to slowly ease into a light exercise routine and increase as tolerated.  If he develops any exertional symptoms or concerns with this that he will notify our office at which point we will look at repeating a stress test.  ER for severe or unrelieved symptoms.  2. Hypertension: Blood pressure little high in the office today.  He is not checking at home.  Recommended checking once a day for the next week at least 1 to 2 hours after morning medications and after sitting calmly about 10 minutes, keep a log, and call if staying greater than 130/80.  If well controlled then go down to checking at least once a week to ensure it does not become elevated between appointments.  3. Hyperlipidemia: PCP faxed over recent CMP and lipid panel.  Total cholesterol was 119, triglycerides 221, HDL 25, LDL 50, VLDL 44.  His CMP showed normal AST but ALT slightly elevated at 56.  He is not having any symptoms.  At this point we are going to repeat a CMP today.  If liver enzymes still elevated then will have him follow-up with PCP for further evaluation and to see if okay to continue statin therapy from that standpoint.  He is going to have PCP take over prescribing a statin as well since they are checking his labs.  Of note, he is only taking 20 mg nightly.  Also recommended healthy diet, exercise, and lifestyle.  4. Suspected sleep apnea: Amenable to home sleep study.  History of snoring, and fatigue, chronic fatigue.  5. Nicotine dependence: Highly recommended smoking cessation and discussed for at least 3 to 5 minutes.  Handouts also given.  He is going to  follow-up with PCP on the smoking cessation as well.  6. Prediabetes: Discussed risks in association with heart disease.  Discussed avoiding white carbohydrates, sugar/sweets, sugary drinks, alcohol and continue to follow with PCP for repeat lipid panel and continued monitoring of sugars as well.  7. Family hx premature CAD    Patient to follow-up with Dr. Sorto in 6 months or sooner if needed for any new, recurrent, or worsening symptoms or other issues/ concerns.       Greater than 13 minutes a 25-minute office visit spent face-to-face with patient discussing healthy diet, exercise, lifestyle, risk factor modification.           Your medication list           Accurate as of August 14, 2020 11:08 AM. If you have any questions, ask your nurse or doctor.               CHANGE how you take these medications      Instructions Last Dose Given Next Dose Due   atorvastatin 40 MG tablet  Commonly known as:  LIPITOR  What changed:  how much to take      Take 1 tablet by mouth Every Night.          CONTINUE taking these medications      Instructions Last Dose Given Next Dose Due   amLODIPine 5 MG tablet  Commonly known as:  NORVASC      Take 5 mg by mouth Daily.       aspirin 81 MG EC tablet      Take 81 mg by mouth daily.       fish oil 1200 MG capsule capsule      Take 1,200 mg by mouth Daily.       levothyroxine 75 MCG tablet  Commonly known as:  SYNTHROID, LEVOTHROID      Take 75 mcg by mouth Daily.       metoprolol succinate XL 25 MG 24 hr tablet  Commonly known as:  TOPROL-XL      TAKE ONE TABLET BY MOUTH DAILY       Peppermint Flavor oil      Take 1 capsule by mouth daily.       ticagrelor 90 MG tablet tablet  Commonly known as:  BRILINTA      Take 1 tablet by mouth 2 (Two) Times a Day.              I did not stop or change the above medications.  Patient's medication list was updated to reflect medications they are currently taking including medication changes made by other providers.        Thanks,    Johana OCONNOR  BRANDON Hernández, APRN  08/14/2020         Dictated utilizing Dragon dictation

## 2020-08-17 ENCOUNTER — TELEPHONE (OUTPATIENT)
Dept: CARDIOLOGY | Facility: CLINIC | Age: 60
End: 2020-08-17

## 2020-08-17 NOTE — TELEPHONE ENCOUNTER
8/17/20  Left [facility] for pt to call back for lab results and instructions/nelson  Pt called back - spoke with him and reviewed these points with him.  Advised him that I would fax these labs to his pcp so they would have when the pt calls them.  pcp [phone] ph [phone]

## 2020-08-17 NOTE — TELEPHONE ENCOUNTER
Please let patient know that liver enzyme remains slightly elevated on the 20mg nightly atorvastatin.  As discussed during recent office visit if enzyme remains elevated: would have him call PCP today to schedule follow-up and to find out if additional testing is needed (liver ultrasound?) and if ok to continue atorvastatin or if dose needs to be decreased further or held with close continued monitoring of lipid panel and hepatic function through PCP.  Very important that patient see PCP to discuss further and very important that he avoid fried and fatty foods and work on healthy diet and lifestyle with cholesterol control being very important to heart health.  Blood sugar was also a little high and sodium borderline low-- discuss with PCP as well and if drinking excessive water would cut back slightly.  Please let me know if he has any additional questions or concerns at this time.

## 2020-08-19 DIAGNOSIS — I25.10 CORONARY ARTERY DISEASE INVOLVING NATIVE CORONARY ARTERY OF NATIVE HEART WITHOUT ANGINA PECTORIS: ICD-10-CM

## 2020-08-19 DIAGNOSIS — I10 ESSENTIAL HYPERTENSION: ICD-10-CM

## 2020-08-20 RX ORDER — METOPROLOL SUCCINATE 25 MG/1
TABLET, EXTENDED RELEASE ORAL
Qty: 30 TABLET | Refills: 3 | Status: SHIPPED | OUTPATIENT
Start: 2020-08-20 | End: 2020-12-22 | Stop reason: SDUPTHER

## 2020-08-21 RX ORDER — TICAGRELOR 90 MG/1
TABLET ORAL
Qty: 60 TABLET | Refills: 10 | Status: SHIPPED | OUTPATIENT
Start: 2020-08-21 | End: 2020-11-02

## 2020-08-28 RX ORDER — ATORVASTATIN CALCIUM 40 MG/1
TABLET, FILM COATED ORAL
Qty: 90 TABLET | Refills: 1 | Status: ON HOLD | OUTPATIENT
Start: 2020-08-28 | End: 2022-04-21

## 2020-09-03 ENCOUNTER — APPOINTMENT (OUTPATIENT)
Dept: SLEEP MEDICINE | Facility: HOSPITAL | Age: 60
End: 2020-09-03

## 2020-11-02 RX ORDER — TICAGRELOR 90 MG/1
TABLET ORAL
Qty: 180 TABLET | Refills: 1 | Status: SHIPPED | OUTPATIENT
Start: 2020-11-02 | End: 2021-04-15 | Stop reason: ALTCHOICE

## 2020-11-06 ENCOUNTER — TELEPHONE (OUTPATIENT)
Dept: CARDIOLOGY | Facility: CLINIC | Age: 60
End: 2020-11-06

## 2020-11-06 NOTE — TELEPHONE ENCOUNTER
Received a copy of labs from patient's PCP showing that his ALT liver enzyme remained elevated and A1c was 6.2%.  Hepatitis panel appeared negative.  Lipid panel showed LDL of 91, still above goal and elevated triglycerides.    ___________________________________      Would like to make sure that he is following with his PCP for further evaluation of elevated liver enzyme and for recommendations regarding continuation of statin therapy, I.g. whether he can continue current dose from liver standpoint, as previously recommended.  Would like him to be on a statin if possible from a cardiac standpoint but he needs to continue to follow with PCP to ensure safety continue from a liver standpoint.  Please also make sure that he is following healthy diet and exercise routine to get cholesterol and sugars under better control.    Attempted to call patient but no answer.  Left voicemail asking him to call office back.

## 2020-11-06 NOTE — TELEPHONE ENCOUNTER
116/20  Pt left [facility] at 3:33 - returning Johana's call.  His ph [phone]/nelson

## 2020-11-09 NOTE — TELEPHONE ENCOUNTER
Called and spoke with patient.  He continues to follow with primary care who is now managing his cholesterol given elevated liver enzyme.  Of note, he only takes 20 mg of atorvastatin nightly and has been doing this since prior to last office visit.  Discussed importance of cholesterol control and healthy diet and exercise.  He is going to continue to follow with primary care for further evaluation and for further management of cholesterol.  Highly recommended smoking cessation as well.

## 2020-12-22 DIAGNOSIS — I25.10 CORONARY ARTERY DISEASE INVOLVING NATIVE CORONARY ARTERY OF NATIVE HEART WITHOUT ANGINA PECTORIS: ICD-10-CM

## 2020-12-22 DIAGNOSIS — I10 ESSENTIAL HYPERTENSION: ICD-10-CM

## 2020-12-22 RX ORDER — METOPROLOL SUCCINATE 25 MG/1
25 TABLET, EXTENDED RELEASE ORAL DAILY
Qty: 90 TABLET | Refills: 3 | Status: SHIPPED | OUTPATIENT
Start: 2020-12-22 | End: 2020-12-24 | Stop reason: SDUPTHER

## 2020-12-24 DIAGNOSIS — I25.10 CORONARY ARTERY DISEASE INVOLVING NATIVE CORONARY ARTERY OF NATIVE HEART WITHOUT ANGINA PECTORIS: ICD-10-CM

## 2020-12-24 DIAGNOSIS — I10 ESSENTIAL HYPERTENSION: ICD-10-CM

## 2020-12-24 RX ORDER — METOPROLOL SUCCINATE 25 MG/1
25 TABLET, EXTENDED RELEASE ORAL DAILY
Qty: 90 TABLET | Refills: 3 | Status: SHIPPED | OUTPATIENT
Start: 2020-12-24 | End: 2022-01-03

## 2021-04-15 ENCOUNTER — OFFICE VISIT (OUTPATIENT)
Dept: CARDIOLOGY | Facility: CLINIC | Age: 61
End: 2021-04-15

## 2021-04-15 VITALS
OXYGEN SATURATION: 98 % | HEART RATE: 56 BPM | BODY MASS INDEX: 30.8 KG/M2 | TEMPERATURE: 97.5 F | DIASTOLIC BLOOD PRESSURE: 84 MMHG | WEIGHT: 220 LBS | SYSTOLIC BLOOD PRESSURE: 124 MMHG | HEIGHT: 71 IN

## 2021-04-15 DIAGNOSIS — R94.39 ABNORMAL NUCLEAR STRESS TEST: ICD-10-CM

## 2021-04-15 DIAGNOSIS — I10 ESSENTIAL HYPERTENSION: ICD-10-CM

## 2021-04-15 DIAGNOSIS — E78.2 MIXED HYPERLIPIDEMIA: ICD-10-CM

## 2021-04-15 DIAGNOSIS — I25.10 CORONARY ARTERY DISEASE INVOLVING NATIVE CORONARY ARTERY OF NATIVE HEART WITHOUT ANGINA PECTORIS: Primary | ICD-10-CM

## 2021-04-15 PROCEDURE — 93000 ELECTROCARDIOGRAM COMPLETE: CPT | Performed by: INTERNAL MEDICINE

## 2021-04-15 PROCEDURE — 99214 OFFICE O/P EST MOD 30 MIN: CPT | Performed by: INTERNAL MEDICINE

## 2021-04-15 NOTE — PROGRESS NOTES
"      CARDIOLOGY    Bettina Sorto MD    ENCOUNTER DATE:  04/15/2021    Hector Ortega / 60 y.o. / male        CHIEF COMPLAINT / REASON FOR OFFICE VISIT     Coronary Artery Disease (1 year follow up)      HISTORY OF PRESENT ILLNESS       HPI    Hector Ortega is a 60 y.o. male     This is a gentleman with a history of coronary artery disease.  Heart catheterization August 2019 as below.    1. Left main: Normal  2. LAD: Hazy, calcified 95% mid vessel stenosis with WILLY II flow  3. LCX: Luminal irregularities  4. RCA: Chronic total occlusion in the proximal segment.  Fills via left to right collaterals.  5.  Normal left ventricular size and systolic function  6.  PCI of the proximal to mid LAD with overlapping 4.0 x 12 mm and 3.5 x 38 mm Xience Trina drug-eluting stents, postdilated to high pressure     He comes in today for routine follow-up.  He is active but not formally exercising.  He denies any palpitations, shortness of breath, edema, lightheadedness, chest pain.  He has some upper sternal discomfort but no aggravating relieving factors.  He does smoke about a pack of cigarettes a day.      REVIEW OF SYSTEMS     Review of Systems   Constitutional: Negative for chills, fever, weight gain and weight loss.   Cardiovascular: Negative for leg swelling.   Respiratory: Negative for cough, snoring and wheezing.    Hematologic/Lymphatic: Negative for bleeding problem. Does not bruise/bleed easily.   Skin: Negative for color change.   Musculoskeletal: Negative for falls, joint pain and myalgias.   Gastrointestinal: Negative for melena.   Genitourinary: Negative for hematuria.   Neurological: Negative for excessive daytime sleepiness.   Psychiatric/Behavioral: Negative for depression. The patient is not nervous/anxious.          VITAL SIGNS     Visit Vitals  /84 (BP Location: Left arm)   Pulse 56   Temp 97.5 °F (36.4 °C)   Ht 180.3 cm (71\")   Wt 99.8 kg (220 lb)   SpO2 98%   BMI 30.68 kg/m²         Wt Readings " from Last 3 Encounters:   04/15/21 99.8 kg (220 lb)   08/14/20 103 kg (226 lb 6.4 oz)   10/11/19 101 kg (221 lb 12.8 oz)     Body mass index is 30.68 kg/m².      PHYSICAL EXAMINATION     Constitutional:       General: Not in acute distress.  Neck:      Vascular: No carotid bruit or JVD.   Pulmonary:      Effort: Pulmonary effort is normal.      Breath sounds: Normal breath sounds.   Cardiovascular:      Normal rate. Regular rhythm.      Murmurs: There is no murmur.   Psychiatric:         Mood and Affect: Mood and affect normal.           REVIEWED DATA       ECG 12 Lead    Date/Time: 4/15/2021 9:19 AM  Performed by: Bettina Sorto MD  Authorized by: Bettina Sorto MD   Comparison: compared with previous ECG from 8/14/2020  Similar to previous ECG  Rhythm: sinus rhythm  BPM: 51  Conduction: conduction normal  ST Segments: ST segments normal  T Waves: T waves normal    Clinical impression: normal ECG                  Lab Results   Component Value Date    GLUCOSE 119 (H) 08/14/2020    BUN 13 08/14/2020    CREATININE 0.92 08/14/2020    EGFRIFNONA 85 08/14/2020    BCR 14.1 08/14/2020    K 4.4 08/14/2020    CO2 22.4 08/14/2020    CALCIUM 9.7 08/14/2020    ALBUMIN 4.50 08/14/2020    AST 32 08/14/2020    ALT 57 (H) 08/14/2020       ASSESSMENT & PLAN      Diagnosis Plan   1. Coronary artery disease involving native coronary artery of native heart without angina pectoris     2. Mixed hyperlipidemia     3. Abnormal nuclear stress test     4. Essential hypertension           1.  Coronary artery disease.  Heart catheterization with PCI to the LAD in August 2019.  Decreased dose of Brilinta.  Stop aspirin.  Continue atorvastatin.  2.  Hypertension   3.  Hyperlipidemia  4.  Tobacco use.  Encouraged cessation.  5.  Family history of premature coronary disease.      Orders Placed This Encounter   Procedures   • ECG 12 Lead     This order was created via procedure documentation     Order Specific Question:   Release to patient      Answer:   Immediate   To the left anterior descending artery placed in August of      MEDICATIONS         Discharge Medications          Accurate as of April 15, 2021  4:17 PM. If you have any questions, ask your nurse or doctor.            Changes to Medications      Instructions Start Date   ticagrelor 60 MG tablet tablet  Commonly known as: Brilinta  What changed:   · medication strength  · how much to take  · Another medication with the same name was removed. Continue taking this medication, and follow the directions you see here.  Changed by: Bettina Sorto MD   60 mg, Oral, 2 Times Daily         Continue These Medications      Instructions Start Date   amLODIPine 5 MG tablet  Commonly known as: NORVASC   5 mg, Oral, Daily      atorvastatin 40 MG tablet  Commonly known as: LIPITOR   TAKE ONE TABLET BY MOUTH ONCE NIGHTLY      fish oil 1200 MG capsule capsule   1,200 mg, Oral, Daily      levothyroxine 75 MCG tablet  Commonly known as: SYNTHROID, LEVOTHROID   75 mcg, Oral, Daily      metoprolol succinate XL 25 MG 24 hr tablet  Commonly known as: TOPROL-XL   25 mg, Oral, Daily      Peppermint Flavor oil   1 capsule, Oral, Daily         Stop These Medications    aspirin 81 MG EC tablet  Stopped by: MD Bettina Garay MD  04/15/21  16:17 EDT    **Dragon Disclaimer:   Much of this encounter note is an electronic transcription/translation of spoken language to printed text. The electronic translation of spoken language may permit erroneous, or at times, nonsensical words or phrases to be inadvertently transcribed. Although I have reviewed the note for such errors, some may still exist.

## 2021-09-09 ENCOUNTER — TRANSCRIBE ORDERS (OUTPATIENT)
Dept: ADMINISTRATIVE | Facility: HOSPITAL | Age: 61
End: 2021-09-09

## 2021-09-09 DIAGNOSIS — F17.200 CURRENT SMOKER: Primary | ICD-10-CM

## 2021-09-23 ENCOUNTER — HOSPITAL ENCOUNTER (OUTPATIENT)
Dept: CT IMAGING | Facility: HOSPITAL | Age: 61
Discharge: HOME | End: 2021-09-23
Admitting: FAMILY MEDICINE

## 2021-09-23 DIAGNOSIS — F17.200 CURRENT SMOKER: ICD-10-CM

## 2021-09-23 PROCEDURE — 71271 CT THORAX LUNG CANCER SCR C-: CPT

## 2021-12-26 NOTE — PLAN OF CARE
Patient: Deshaun Carrillo    Problem: Patient Care Overview  Goal: Plan of Care Review  Outcome: Ongoing (interventions implemented as appropriate)   08/21/19 0510   Coping/Psychosocial   Plan of Care Reviewed With patient   Plan of Care Review   Progress improving   OTHER   Outcome Summary R radial site CDI, soft. No c/o pain, no falls, no SOA. IV fluids stopped per order. Ambulating well. VSS, will continue to monitor.       Problem: Cardiac Catheterization (Diagnostic/Interventional) (Adult)  Goal: Signs and Symptoms of Listed Potential Problems Will be Absent, Minimized or Managed (Cardiac Catheterization)  Outcome: Ongoing (interventions implemented as appropriate)    Goal: Anesthesia/Sedation Recovery  Outcome: Outcome(s) achieved Date Met: 08/21/19      Problem: Cardiac: ACS (Acute Coronary Syndrome) (Adult)  Goal: Signs and Symptoms of Listed Potential Problems Will be Absent, Minimized or Managed (Cardiac: ACS)  Outcome: Ongoing (interventions implemented as appropriate)

## 2021-12-31 DIAGNOSIS — I25.10 CORONARY ARTERY DISEASE INVOLVING NATIVE CORONARY ARTERY OF NATIVE HEART WITHOUT ANGINA PECTORIS: ICD-10-CM

## 2021-12-31 DIAGNOSIS — I10 ESSENTIAL HYPERTENSION: ICD-10-CM

## 2022-01-03 RX ORDER — METOPROLOL SUCCINATE 25 MG/1
TABLET, EXTENDED RELEASE ORAL
Qty: 90 TABLET | Refills: 3 | Status: SHIPPED | OUTPATIENT
Start: 2022-01-03 | End: 2023-01-09

## 2022-04-05 ENCOUNTER — OFFICE VISIT (OUTPATIENT)
Dept: CARDIOLOGY | Facility: CLINIC | Age: 62
End: 2022-04-05

## 2022-04-05 VITALS
WEIGHT: 221 LBS | RESPIRATION RATE: 16 BRPM | HEIGHT: 71 IN | BODY MASS INDEX: 30.94 KG/M2 | SYSTOLIC BLOOD PRESSURE: 124 MMHG | OXYGEN SATURATION: 99 % | DIASTOLIC BLOOD PRESSURE: 82 MMHG | HEART RATE: 54 BPM

## 2022-04-05 DIAGNOSIS — G47.19 OTHER HYPERSOMNIA: ICD-10-CM

## 2022-04-05 DIAGNOSIS — R29.818 SUSPECTED SLEEP APNEA: ICD-10-CM

## 2022-04-05 DIAGNOSIS — R94.39 ABNORMAL NUCLEAR STRESS TEST: ICD-10-CM

## 2022-04-05 DIAGNOSIS — I25.10 CORONARY ARTERY DISEASE INVOLVING NATIVE CORONARY ARTERY OF NATIVE HEART WITHOUT ANGINA PECTORIS: Primary | ICD-10-CM

## 2022-04-05 DIAGNOSIS — E78.2 MIXED HYPERLIPIDEMIA: ICD-10-CM

## 2022-04-05 DIAGNOSIS — I10 ESSENTIAL HYPERTENSION: ICD-10-CM

## 2022-04-05 PROCEDURE — 99214 OFFICE O/P EST MOD 30 MIN: CPT | Performed by: INTERNAL MEDICINE

## 2022-04-05 PROCEDURE — 93000 ELECTROCARDIOGRAM COMPLETE: CPT | Performed by: INTERNAL MEDICINE

## 2022-04-05 RX ORDER — PITAVASTATIN CALCIUM 2.09 MG/1
TABLET, FILM COATED ORAL
COMMUNITY
Start: 2022-01-22 | End: 2023-04-06 | Stop reason: SDUPTHER

## 2022-04-05 NOTE — H&P (VIEW-ONLY)
CARDIOLOGY    Bettina Sorto MD    ENCOUNTER DATE:  04/05/2022    Hector Ortega / 61 y.o. / male        CHIEF COMPLAINT / REASON FOR OFFICE VISIT     Heart Problem (Yearly follow up)      HISTORY OF PRESENT ILLNESS       HPI    Hector Ortega is a 61 y.o. male     This gentleman has hyperlipidemia and family history of premature coronary artery disease, and a history of smoking.  I saw him in 08/2019 and he was complaining of chest pain with shortness of breath on exertion. He had a heart catheterization which showed a 95% mid LAD stenosis with overall normal LV function. He had drug-eluting stents placed to the LAD. He comes in today for followup. He says he is feeling some tightness in his chest. He does not know if it is a muscle spasm, or acid reflux, or his heart. It sounds like it is similar to the symptoms he had prior to his stent placement. He notices it when he is walking his dog but it can also happen at other times.        REVIEW OF SYSTEMS     Review of Systems   Constitutional: Negative for fever, malaise/fatigue, weight gain and weight loss.   HENT: Negative for ear pain, hearing loss, nosebleeds and sore throat.    Eyes: Negative for double vision, pain, vision loss in left eye and vision loss in right eye.   Cardiovascular:        See history of present illness.   Respiratory: Negative for cough, shortness of breath, sleep disturbances due to breathing, snoring and wheezing.    Endocrine: Negative for cold intolerance, heat intolerance and polyuria.   Skin: Negative for itching, poor wound healing and rash.   Musculoskeletal: Negative for joint pain, joint swelling and myalgias.   Gastrointestinal: Negative for abdominal pain, diarrhea, hematochezia, nausea and vomiting.   Genitourinary: Negative for hematuria and hesitancy.   Neurological: Negative for numbness, paresthesias and seizures.   Psychiatric/Behavioral: Negative for depression. The patient is not nervous/anxious.          VITAL  "SIGNS     Visit Vitals  /82 (BP Location: Right arm, Patient Position: Sitting, Cuff Size: Adult)   Pulse 54   Resp 16   Ht 180.3 cm (71\")   Wt 100 kg (221 lb)   SpO2 99%   BMI 30.82 kg/m²         Wt Readings from Last 3 Encounters:   04/05/22 100 kg (221 lb)   04/15/21 99.8 kg (220 lb)   08/14/20 103 kg (226 lb 6.4 oz)     Body mass index is 30.82 kg/m².      PHYSICAL EXAMINATION     Constitutional:       General: Not in acute distress.  Neck:      Vascular: No carotid bruit or JVD.   Pulmonary:      Effort: Pulmonary effort is normal.      Breath sounds: Normal breath sounds.   Cardiovascular:      Normal rate. Regular rhythm.      Murmurs: There is no murmur.   Psychiatric:         Mood and Affect: Mood and affect normal.           REVIEWED DATA       ECG 12 Lead    Date/Time: 4/5/2022 9:46 AM  Performed by: Bettina Sorto MD  Authorized by: Bettina Sorto MD   Comparison: compared with previous ECG from 4/15/2021  Similar to previous ECG  Rhythm: sinus rhythm  BPM: 53  Conduction: conduction normal  ST Segments: ST segments normal  T Waves: T waves normal    Clinical impression: normal ECG                  Lab Results   Component Value Date    GLUCOSE 119 (H) 08/14/2020    BUN 13 08/14/2020    CREATININE 0.92 08/14/2020    EGFRIFNONA 85 08/14/2020    BCR 14.1 08/14/2020    K 4.4 08/14/2020    CO2 22.4 08/14/2020    CALCIUM 9.7 08/14/2020    ALBUMIN 4.50 08/14/2020    AST 32 08/14/2020    ALT 57 (H) 08/14/2020       ASSESSMENT & PLAN      Diagnosis Plan   1. Coronary artery disease involving native coronary artery of native heart without angina pectoris  Stress Test With Myocardial Perfusion One Day   2. Essential hypertension  Stress Test With Myocardial Perfusion One Day   3. Mixed hyperlipidemia  Stress Test With Myocardial Perfusion One Day   4. Abnormal nuclear stress test  Stress Test With Myocardial Perfusion One Day   5. Suspected sleep apnea  Stress Test With Myocardial Perfusion One Day   6. " Other hypersomnia  Stress Test With Myocardial Perfusion One Day       1.  Coronary artery disease with an abnormal stress test suggesting ischemia in the inferior wall.  This was done at [facility] in July 2019.  Heart catheterization showed a chronic total occlusion of the right and an LAD 95% stenosis which was stented.    He is having some symptoms including chest pain or shortness of breath with exertion which I am concerned could be a recurrence of his coronary disease.  I recommend that he have a stress test and if that is abnormal I would not hesitate to send him for heart catheterization.  2.  Borderline diabetes.  I reviewed his recent lab work from the fall 2021 and his hemoglobin A1c was mildly elevated.  3.  Systemic hypertension.  His blood pressures controlled.  4.  Hyperlipidemia.  I also reviewed his lipid panel and his HDL is low and his LDL is a little bit higher than I would like it to be.  I have recommended routine exercise.  5.  Smoking.  Unfortunately he is still smoking.  6.  Brother with a heart attack in his mid 50s.    He is going to have a nuclear stress test and either I or one of my nurses will go over the results with him.  If it is abnormal, I recommend heart catheterization.    Orders Placed This Encounter   Procedures   • Stress Test With Myocardial Perfusion One Day     Standing Status:   Future     Standing Expiration Date:   4/5/2023     Order Specific Question:   Rest/stress, rest only or stress only?     Answer:   Rest/Stress     Order Specific Question:   What stress agent will be used?     Answer:   Exercise with possible pharmacologic     Order Specific Question:   Reason for exam?     Answer:   Chest Pain     Order Specific Question:   Reason for exam?     Answer:   Known Coronary Artery Disease     Order Specific Question:   Release to patient     Answer:   Immediate   • ECG 12 Lead     This order was created via procedure documentation     Order Specific Question:   Release  to patient     Answer:   Immediate           MEDICATIONS         Discharge Medications          Accurate as of April 5, 2022  9:46 AM. If you have any questions, ask your nurse or doctor.            Continue These Medications      Instructions Start Date   amLODIPine 5 MG tablet  Commonly known as: NORVASC   5 mg, Oral, Daily      atorvastatin 40 MG tablet  Commonly known as: LIPITOR   TAKE ONE TABLET BY MOUTH ONCE NIGHTLY      levothyroxine 75 MCG tablet  Commonly known as: SYNTHROID, LEVOTHROID   75 mcg, Oral, Daily      Livalo 2 MG tablet tablet  Generic drug: pitavastatin calcium   No dose, route, or frequency recorded.      metoprolol succinate XL 25 MG 24 hr tablet  Commonly known as: TOPROL-XL   TAKE ONE TABLET BY MOUTH DAILY      Peppermint Flavor oil   1 capsule, Oral, Daily      ticagrelor 60 MG tablet tablet  Commonly known as: Brilinta   60 mg, Oral, 2 Times Daily               Bettina Sorto MD  04/05/22  09:46 EDT    **Fabio Disclaimer:   Much of this encounter note is an electronic transcription/translation of spoken language to printed text. The electronic translation of spoken language may permit erroneous, or at times, nonsensical words or phrases to be inadvertently transcribed. Although I have reviewed the note for such errors, some may still exist.

## 2022-04-05 NOTE — PROGRESS NOTES
CARDIOLOGY    Bettina Sorto MD    ENCOUNTER DATE:  04/05/2022    Hector Ortega / 61 y.o. / male        CHIEF COMPLAINT / REASON FOR OFFICE VISIT     Heart Problem (Yearly follow up)      HISTORY OF PRESENT ILLNESS       HPI    Hector Ortega is a 61 y.o. male     This gentleman has hyperlipidemia and family history of premature coronary artery disease, and a history of smoking.  I saw him in 08/2019 and he was complaining of chest pain with shortness of breath on exertion. He had a heart catheterization which showed a 95% mid LAD stenosis with overall normal LV function. He had drug-eluting stents placed to the LAD. He comes in today for followup. He says he is feeling some tightness in his chest. He does not know if it is a muscle spasm, or acid reflux, or his heart. It sounds like it is similar to the symptoms he had prior to his stent placement. He notices it when he is walking his dog but it can also happen at other times.        REVIEW OF SYSTEMS     Review of Systems   Constitutional: Negative for fever, malaise/fatigue, weight gain and weight loss.   HENT: Negative for ear pain, hearing loss, nosebleeds and sore throat.    Eyes: Negative for double vision, pain, vision loss in left eye and vision loss in right eye.   Cardiovascular:        See history of present illness.   Respiratory: Negative for cough, shortness of breath, sleep disturbances due to breathing, snoring and wheezing.    Endocrine: Negative for cold intolerance, heat intolerance and polyuria.   Skin: Negative for itching, poor wound healing and rash.   Musculoskeletal: Negative for joint pain, joint swelling and myalgias.   Gastrointestinal: Negative for abdominal pain, diarrhea, hematochezia, nausea and vomiting.   Genitourinary: Negative for hematuria and hesitancy.   Neurological: Negative for numbness, paresthesias and seizures.   Psychiatric/Behavioral: Negative for depression. The patient is not nervous/anxious.          VITAL  "SIGNS     Visit Vitals  /82 (BP Location: Right arm, Patient Position: Sitting, Cuff Size: Adult)   Pulse 54   Resp 16   Ht 180.3 cm (71\")   Wt 100 kg (221 lb)   SpO2 99%   BMI 30.82 kg/m²         Wt Readings from Last 3 Encounters:   04/05/22 100 kg (221 lb)   04/15/21 99.8 kg (220 lb)   08/14/20 103 kg (226 lb 6.4 oz)     Body mass index is 30.82 kg/m².      PHYSICAL EXAMINATION     Constitutional:       General: Not in acute distress.  Neck:      Vascular: No carotid bruit or JVD.   Pulmonary:      Effort: Pulmonary effort is normal.      Breath sounds: Normal breath sounds.   Cardiovascular:      Normal rate. Regular rhythm.      Murmurs: There is no murmur.   Psychiatric:         Mood and Affect: Mood and affect normal.           REVIEWED DATA       ECG 12 Lead    Date/Time: 4/5/2022 9:46 AM  Performed by: Bettina Sorto MD  Authorized by: Bettina Sorto MD   Comparison: compared with previous ECG from 4/15/2021  Similar to previous ECG  Rhythm: sinus rhythm  BPM: 53  Conduction: conduction normal  ST Segments: ST segments normal  T Waves: T waves normal    Clinical impression: normal ECG                  Lab Results   Component Value Date    GLUCOSE 119 (H) 08/14/2020    BUN 13 08/14/2020    CREATININE 0.92 08/14/2020    EGFRIFNONA 85 08/14/2020    BCR 14.1 08/14/2020    K 4.4 08/14/2020    CO2 22.4 08/14/2020    CALCIUM 9.7 08/14/2020    ALBUMIN 4.50 08/14/2020    AST 32 08/14/2020    ALT 57 (H) 08/14/2020       ASSESSMENT & PLAN      Diagnosis Plan   1. Coronary artery disease involving native coronary artery of native heart without angina pectoris  Stress Test With Myocardial Perfusion One Day   2. Essential hypertension  Stress Test With Myocardial Perfusion One Day   3. Mixed hyperlipidemia  Stress Test With Myocardial Perfusion One Day   4. Abnormal nuclear stress test  Stress Test With Myocardial Perfusion One Day   5. Suspected sleep apnea  Stress Test With Myocardial Perfusion One Day   6. " Other hypersomnia  Stress Test With Myocardial Perfusion One Day       1.  Coronary artery disease with an abnormal stress test suggesting ischemia in the inferior wall.  This was done at [facility] in July 2019.  Heart catheterization showed a chronic total occlusion of the right and an LAD 95% stenosis which was stented.    He is having some symptoms including chest pain or shortness of breath with exertion which I am concerned could be a recurrence of his coronary disease.  I recommend that he have a stress test and if that is abnormal I would not hesitate to send him for heart catheterization.  2.  Borderline diabetes.  I reviewed his recent lab work from the fall 2021 and his hemoglobin A1c was mildly elevated.  3.  Systemic hypertension.  His blood pressures controlled.  4.  Hyperlipidemia.  I also reviewed his lipid panel and his HDL is low and his LDL is a little bit higher than I would like it to be.  I have recommended routine exercise.  5.  Smoking.  Unfortunately he is still smoking.  6.  Brother with a heart attack in his mid 50s.    He is going to have a nuclear stress test and either I or one of my nurses will go over the results with him.  If it is abnormal, I recommend heart catheterization.    Orders Placed This Encounter   Procedures   • Stress Test With Myocardial Perfusion One Day     Standing Status:   Future     Standing Expiration Date:   4/5/2023     Order Specific Question:   Rest/stress, rest only or stress only?     Answer:   Rest/Stress     Order Specific Question:   What stress agent will be used?     Answer:   Exercise with possible pharmacologic     Order Specific Question:   Reason for exam?     Answer:   Chest Pain     Order Specific Question:   Reason for exam?     Answer:   Known Coronary Artery Disease     Order Specific Question:   Release to patient     Answer:   Immediate   • ECG 12 Lead     This order was created via procedure documentation     Order Specific Question:   Release  to patient     Answer:   Immediate           MEDICATIONS         Discharge Medications          Accurate as of April 5, 2022  9:46 AM. If you have any questions, ask your nurse or doctor.            Continue These Medications      Instructions Start Date   amLODIPine 5 MG tablet  Commonly known as: NORVASC   5 mg, Oral, Daily      atorvastatin 40 MG tablet  Commonly known as: LIPITOR   TAKE ONE TABLET BY MOUTH ONCE NIGHTLY      levothyroxine 75 MCG tablet  Commonly known as: SYNTHROID, LEVOTHROID   75 mcg, Oral, Daily      Livalo 2 MG tablet tablet  Generic drug: pitavastatin calcium   No dose, route, or frequency recorded.      metoprolol succinate XL 25 MG 24 hr tablet  Commonly known as: TOPROL-XL   TAKE ONE TABLET BY MOUTH DAILY      Peppermint Flavor oil   1 capsule, Oral, Daily      ticagrelor 60 MG tablet tablet  Commonly known as: Brilinta   60 mg, Oral, 2 Times Daily               Bettina Sorto MD  04/05/22  09:46 EDT    **Fabio Disclaimer:   Much of this encounter note is an electronic transcription/translation of spoken language to printed text. The electronic translation of spoken language may permit erroneous, or at times, nonsensical words or phrases to be inadvertently transcribed. Although I have reviewed the note for such errors, some may still exist.

## 2022-04-13 ENCOUNTER — HOSPITAL ENCOUNTER (OUTPATIENT)
Dept: CARDIOLOGY | Facility: HOSPITAL | Age: 62
Discharge: HOME | End: 2022-04-13
Admitting: INTERNAL MEDICINE

## 2022-04-13 VITALS — HEIGHT: 71 IN | WEIGHT: 220.46 LBS | BODY MASS INDEX: 30.86 KG/M2

## 2022-04-13 DIAGNOSIS — G47.19 OTHER HYPERSOMNIA: ICD-10-CM

## 2022-04-13 DIAGNOSIS — R94.39 ABNORMAL NUCLEAR STRESS TEST: ICD-10-CM

## 2022-04-13 DIAGNOSIS — I10 ESSENTIAL HYPERTENSION: ICD-10-CM

## 2022-04-13 DIAGNOSIS — I25.10 CORONARY ARTERY DISEASE INVOLVING NATIVE CORONARY ARTERY OF NATIVE HEART WITHOUT ANGINA PECTORIS: ICD-10-CM

## 2022-04-13 DIAGNOSIS — E78.2 MIXED HYPERLIPIDEMIA: ICD-10-CM

## 2022-04-13 DIAGNOSIS — R29.818 SUSPECTED SLEEP APNEA: ICD-10-CM

## 2022-04-13 LAB
BH CV NUCLEAR PRIOR STUDY: 2
BH CV REST NUCLEAR ISOTOPE DOSE: 11.5 MCI
BH CV STRESS BP STAGE 1: NORMAL
BH CV STRESS BP STAGE 2: NORMAL
BH CV STRESS DURATION MIN STAGE 1: 3
BH CV STRESS DURATION MIN STAGE 2: 3
BH CV STRESS DURATION MIN STAGE 3: 1
BH CV STRESS DURATION SEC STAGE 1: 0
BH CV STRESS DURATION SEC STAGE 2: 0
BH CV STRESS DURATION SEC STAGE 3: 21
BH CV STRESS GRADE STAGE 1: 10
BH CV STRESS GRADE STAGE 2: 12
BH CV STRESS GRADE STAGE 3: 14
BH CV STRESS HR STAGE 1: 99
BH CV STRESS HR STAGE 2: 122
BH CV STRESS HR STAGE 3: 125
BH CV STRESS METS STAGE 1: 5
BH CV STRESS METS STAGE 2: 7.5
BH CV STRESS METS STAGE 3: 9
BH CV STRESS NUCLEAR ISOTOPE DOSE: 34.2 MCI
BH CV STRESS PROTOCOL 1: NORMAL
BH CV STRESS PROTOCOL 2 BP STAGE 1: NORMAL
BH CV STRESS PROTOCOL 2 COMMENTS STAGE 1: NORMAL
BH CV STRESS PROTOCOL 2 DOSE REGADENOSON STAGE 1: 0.4
BH CV STRESS PROTOCOL 2 DURATION MIN STAGE 1: 0
BH CV STRESS PROTOCOL 2 DURATION SEC STAGE 1: 10
BH CV STRESS PROTOCOL 2 HR STAGE 1: 114
BH CV STRESS PROTOCOL 2 STAGE 1: 1
BH CV STRESS PROTOCOL 2: NORMAL
BH CV STRESS RECOVERY BP: NORMAL MMHG
BH CV STRESS RECOVERY HR: 77 BPM
BH CV STRESS SPEED STAGE 1: 1.7
BH CV STRESS SPEED STAGE 2: 2.5
BH CV STRESS SPEED STAGE 3: 3.4
BH CV STRESS STAGE 1: 1
BH CV STRESS STAGE 2: 2
BH CV STRESS STAGE 3: 3
LV EF NUC BP: 56 %
MAXIMAL PREDICTED HEART RATE: 161 BPM
PERCENT MAX PREDICTED HR: 70.81 %
STRESS BASELINE BP: NORMAL MMHG
STRESS BASELINE HR: 63 BPM
STRESS PERCENT HR: 83 %
STRESS POST EXERCISE DUR SEC: 10 SEC
STRESS POST PEAK BP: NORMAL MMHG
STRESS POST PEAK HR: 114 BPM
STRESS TARGET HR: 137 BPM

## 2022-04-13 PROCEDURE — 0 TECHNETIUM TETROFOSMIN KIT: Performed by: INTERNAL MEDICINE

## 2022-04-13 PROCEDURE — A9502 TC99M TETROFOSMIN: HCPCS | Performed by: INTERNAL MEDICINE

## 2022-04-13 PROCEDURE — 25010000002 REGADENOSON 0.4 MG/5ML SOLUTION: Performed by: INTERNAL MEDICINE

## 2022-04-13 PROCEDURE — 78452 HT MUSCLE IMAGE SPECT MULT: CPT | Performed by: INTERNAL MEDICINE

## 2022-04-13 PROCEDURE — 93017 CV STRESS TEST TRACING ONLY: CPT

## 2022-04-13 PROCEDURE — 93016 CV STRESS TEST SUPVJ ONLY: CPT | Performed by: INTERNAL MEDICINE

## 2022-04-13 PROCEDURE — 25010000002 AMINOPHYLLINE PER 250 MG: Performed by: INTERNAL MEDICINE

## 2022-04-13 PROCEDURE — 93018 CV STRESS TEST I&R ONLY: CPT | Performed by: INTERNAL MEDICINE

## 2022-04-13 PROCEDURE — 78452 HT MUSCLE IMAGE SPECT MULT: CPT

## 2022-04-13 RX ORDER — AMINOPHYLLINE DIHYDRATE 25 MG/ML
125 INJECTION, SOLUTION INTRAVENOUS ONCE AS NEEDED
Status: COMPLETED | OUTPATIENT
Start: 2022-04-13 | End: 2022-04-13

## 2022-04-13 RX ADMIN — REGADENOSON 0.4 MG: 0.08 INJECTION, SOLUTION INTRAVENOUS at 09:21

## 2022-04-13 RX ADMIN — AMINOPHYLLINE 125 MG: 25 INJECTION, SOLUTION INTRAVENOUS at 09:24

## 2022-04-13 RX ADMIN — TETROFOSMIN 1 DOSE: 1.38 INJECTION, POWDER, LYOPHILIZED, FOR SOLUTION INTRAVENOUS at 08:15

## 2022-04-13 RX ADMIN — TETROFOSMIN 1 DOSE: 1.38 INJECTION, POWDER, LYOPHILIZED, FOR SOLUTION INTRAVENOUS at 09:21

## 2022-04-14 ENCOUNTER — TELEPHONE (OUTPATIENT)
Dept: CARDIOLOGY | Facility: CLINIC | Age: 62
End: 2022-04-14

## 2022-04-14 DIAGNOSIS — R07.89 CHEST PAIN, ATYPICAL: Primary | ICD-10-CM

## 2022-04-14 DIAGNOSIS — R94.39 ABNORMAL NUCLEAR STRESS TEST: ICD-10-CM

## 2022-04-14 NOTE — TELEPHONE ENCOUNTER
S/w patient about abnormal stress results.  Needs cath sometime next week. He is agreeable to proceed.  He reports continued intermittent chest pain and shortness of breath with exertion but his symptoms are not progressed since when he was last in office.

## 2022-04-15 ENCOUNTER — TRANSCRIBE ORDERS (OUTPATIENT)
Dept: CARDIOLOGY | Facility: CLINIC | Age: 62
End: 2022-04-15

## 2022-04-15 DIAGNOSIS — Z13.6 SCREENING FOR ISCHEMIC HEART DISEASE: ICD-10-CM

## 2022-04-15 DIAGNOSIS — Z01.810 PRE-OPERATIVE CARDIOVASCULAR EXAMINATION: ICD-10-CM

## 2022-04-15 DIAGNOSIS — Z01.818 OTHER SPECIFIED PRE-OPERATIVE EXAMINATION: Primary | ICD-10-CM

## 2022-04-18 ENCOUNTER — TELEPHONE (OUTPATIENT)
Dept: CARDIOLOGY | Facility: CLINIC | Age: 62
End: 2022-04-18

## 2022-04-18 PROBLEM — R07.89 CHEST PAIN, ATYPICAL: Status: ACTIVE | Noted: 2022-04-18

## 2022-04-18 NOTE — TELEPHONE ENCOUNTER
Patient is calling requesting to speak with someone about getting scheduled for a heart cath. His call back number is [phone]

## 2022-04-19 ENCOUNTER — LAB (OUTPATIENT)
Dept: LAB | Facility: HOSPITAL | Age: 62
End: 2022-04-19

## 2022-04-19 DIAGNOSIS — Z01.818 OTHER SPECIFIED PRE-OPERATIVE EXAMINATION: ICD-10-CM

## 2022-04-19 DIAGNOSIS — Z13.6 SCREENING FOR ISCHEMIC HEART DISEASE: ICD-10-CM

## 2022-04-19 DIAGNOSIS — Z01.810 PRE-OPERATIVE CARDIOVASCULAR EXAMINATION: ICD-10-CM

## 2022-04-19 LAB
ANION GAP SERPL CALCULATED.3IONS-SCNC: 9 MMOL/L (ref 5–15)
BASOPHILS # BLD AUTO: 0.03 10*3/MM3 (ref 0–0.2)
BASOPHILS NFR BLD AUTO: 0.4 % (ref 0–1.5)
BUN SERPL-MCNC: 14 MG/DL (ref 6–20)
BUN/CREAT SERPL: 13 (ref 7–25)
CALCIUM SPEC-SCNC: 9.4 MG/DL (ref 8.6–10.5)
CHLORIDE SERPL-SCNC: 102 MMOL/L (ref 98–107)
CO2 SERPL-SCNC: 25 MMOL/L (ref 22–29)
CREAT SERPL-MCNC: 1.08 MG/DL (ref 0.76–1.27)
DEPRECATED RDW RBC AUTO: 40.5 FL (ref 37–54)
EGFRCR SERPLBLD CKD-EPI 2021: 79.1 ML/MIN/1.73
EOSINOPHIL # BLD AUTO: 0.14 10*3/MM3 (ref 0–0.4)
EOSINOPHIL NFR BLD AUTO: 2 % (ref 0.3–6.2)
ERYTHROCYTE [DISTWIDTH] IN BLOOD BY AUTOMATED COUNT: 11.9 % (ref 12.3–15.4)
GLUCOSE SERPL-MCNC: 133 MG/DL (ref 65–99)
HCT VFR BLD AUTO: 50.8 % (ref 37.5–51)
HGB BLD-MCNC: 17.9 G/DL (ref 13–17.7)
IMM GRANULOCYTES # BLD AUTO: 0.05 10*3/MM3 (ref 0–0.05)
IMM GRANULOCYTES NFR BLD AUTO: 0.7 % (ref 0–0.5)
LYMPHOCYTES # BLD AUTO: 1.45 10*3/MM3 (ref 0.7–3.1)
LYMPHOCYTES NFR BLD AUTO: 20.9 % (ref 19.6–45.3)
MCH RBC QN AUTO: 33.5 PG (ref 26.6–33)
MCHC RBC AUTO-ENTMCNC: 35.2 G/DL (ref 31.5–35.7)
MCV RBC AUTO: 95 FL (ref 79–97)
MONOCYTES # BLD AUTO: 0.86 10*3/MM3 (ref 0.1–0.9)
MONOCYTES NFR BLD AUTO: 12.4 % (ref 5–12)
NEUTROPHILS NFR BLD AUTO: 4.41 10*3/MM3 (ref 1.7–7)
NEUTROPHILS NFR BLD AUTO: 63.6 % (ref 42.7–76)
NRBC BLD AUTO-RTO: 0 /100 WBC (ref 0–0.2)
PLATELET # BLD AUTO: 196 10*3/MM3 (ref 140–450)
PMV BLD AUTO: 10.3 FL (ref 6–12)
POTASSIUM SERPL-SCNC: 4.5 MMOL/L (ref 3.5–5.2)
RBC # BLD AUTO: 5.35 10*6/MM3 (ref 4.14–5.8)
SARS-COV-2 ORF1AB RESP QL NAA+PROBE: NOT DETECTED
SODIUM SERPL-SCNC: 136 MMOL/L (ref 136–145)
WBC NRBC COR # BLD: 6.94 10*3/MM3 (ref 3.4–10.8)

## 2022-04-19 PROCEDURE — 36415 COLL VENOUS BLD VENIPUNCTURE: CPT

## 2022-04-19 PROCEDURE — 85025 COMPLETE CBC W/AUTO DIFF WBC: CPT

## 2022-04-19 PROCEDURE — U0004 COV-19 TEST NON-CDC HGH THRU: HCPCS

## 2022-04-19 PROCEDURE — C9803 HOPD COVID-19 SPEC COLLECT: HCPCS

## 2022-04-19 PROCEDURE — 80048 BASIC METABOLIC PNL TOTAL CA: CPT

## 2022-04-21 ENCOUNTER — HOSPITAL ENCOUNTER (OUTPATIENT)
Facility: HOSPITAL | Age: 62
Setting detail: HOSPITAL OUTPATIENT SURGERY
Discharge: HOME | End: 2022-04-21
Attending: INTERNAL MEDICINE | Admitting: NURSE PRACTITIONER

## 2022-04-21 VITALS
SYSTOLIC BLOOD PRESSURE: 113 MMHG | TEMPERATURE: 97.4 F | DIASTOLIC BLOOD PRESSURE: 75 MMHG | RESPIRATION RATE: 16 BRPM | WEIGHT: 220 LBS | HEIGHT: 71 IN | HEART RATE: 54 BPM | OXYGEN SATURATION: 94 % | BODY MASS INDEX: 30.8 KG/M2

## 2022-04-21 DIAGNOSIS — R07.89 CHEST PAIN, ATYPICAL: ICD-10-CM

## 2022-04-21 DIAGNOSIS — R94.39 ABNORMAL NUCLEAR STRESS TEST: ICD-10-CM

## 2022-04-21 PROCEDURE — C1769 GUIDE WIRE: HCPCS | Performed by: INTERNAL MEDICINE

## 2022-04-21 PROCEDURE — 25010000002 HEPARIN (PORCINE) PER 1000 UNITS: Performed by: INTERNAL MEDICINE

## 2022-04-21 PROCEDURE — 93458 L HRT ARTERY/VENTRICLE ANGIO: CPT | Performed by: INTERNAL MEDICINE

## 2022-04-21 PROCEDURE — 25010000002 MIDAZOLAM PER 1 MG: Performed by: INTERNAL MEDICINE

## 2022-04-21 PROCEDURE — 25010000002 FENTANYL CITRATE (PF) 50 MCG/ML SOLUTION: Performed by: INTERNAL MEDICINE

## 2022-04-21 PROCEDURE — C1894 INTRO/SHEATH, NON-LASER: HCPCS | Performed by: INTERNAL MEDICINE

## 2022-04-21 PROCEDURE — 0 IOPAMIDOL PER 1 ML: Performed by: INTERNAL MEDICINE

## 2022-04-21 RX ORDER — SODIUM CHLORIDE 0.9 % (FLUSH) 0.9 %
10 SYRINGE (ML) INJECTION AS NEEDED
Status: DISCONTINUED | OUTPATIENT
Start: 2022-04-21 | End: 2022-04-21 | Stop reason: HOSPADM

## 2022-04-21 RX ORDER — ACETAMINOPHEN 325 MG/1
650 TABLET ORAL EVERY 4 HOURS PRN
Status: DISCONTINUED | OUTPATIENT
Start: 2022-04-21 | End: 2022-04-21 | Stop reason: HOSPADM

## 2022-04-21 RX ORDER — SODIUM CHLORIDE 9 MG/ML
75 INJECTION, SOLUTION INTRAVENOUS CONTINUOUS
Status: DISCONTINUED | OUTPATIENT
Start: 2022-04-21 | End: 2022-04-21 | Stop reason: HOSPADM

## 2022-04-21 RX ORDER — SODIUM CHLORIDE 0.9 % (FLUSH) 0.9 %
10 SYRINGE (ML) INJECTION EVERY 12 HOURS SCHEDULED
Status: DISCONTINUED | OUTPATIENT
Start: 2022-04-21 | End: 2022-04-21 | Stop reason: HOSPADM

## 2022-04-21 RX ORDER — LIDOCAINE HYDROCHLORIDE 20 MG/ML
INJECTION, SOLUTION INFILTRATION; PERINEURAL AS NEEDED
Status: DISCONTINUED | OUTPATIENT
Start: 2022-04-21 | End: 2022-04-21 | Stop reason: HOSPADM

## 2022-04-21 RX ORDER — FENTANYL CITRATE 50 UG/ML
INJECTION, SOLUTION INTRAMUSCULAR; INTRAVENOUS AS NEEDED
Status: DISCONTINUED | OUTPATIENT
Start: 2022-04-21 | End: 2022-04-21 | Stop reason: HOSPADM

## 2022-04-21 RX ORDER — MIDAZOLAM HYDROCHLORIDE 1 MG/ML
INJECTION INTRAMUSCULAR; INTRAVENOUS AS NEEDED
Status: DISCONTINUED | OUTPATIENT
Start: 2022-04-21 | End: 2022-04-21 | Stop reason: HOSPADM

## 2022-04-21 RX ADMIN — SODIUM CHLORIDE 75 ML/HR: 9 INJECTION, SOLUTION INTRAVENOUS at 11:59

## 2022-04-21 NOTE — DISCHARGE INSTRUCTIONS
[facility]  [address] [place] [address]    Coronary Angiogram (Radial/Ulnar Approach) After Care    Refer to this sheet in the next few weeks. These instructions provide you with information on caring for yourself after your procedure. Your caregiver may also give you more specific instructions. Your treatment has been planned according to current medical practices, but problems sometimes occur. Call your caregiver if you have any problems or questions after your procedure.    Home Care Instructions:  You may shower the day after the procedure. Remove the bandage (dressing) and gently wash the site with plain soap and water. Gently pat the site dry. You may apply a band aid daily for 2 days if desired.    Do not apply powder or lotion to the site.  Do not submerge the affected site in water for 3 to 5 days or until the site is completely healed.   Do not lift, push or pull anything over 5 pounds for 5 days after your procedure or as directed by your physician.  As a reference, a gallon of milk weighs 8 pounds.   Inspect the site at least twice daily. You may notice some bruising at the site and it may be tender for 1 to 2 weeks.     Increase your fluid intake for the next 2 days.    Keep arm elevated for 24 hours. For the remainder of the day, keep your arm in “Pledge of Allegiance” position when up and about.     You may drive 24 hours after the procedure unless otherwise instructed by your caregiver.  Do not operate machinery or power tools for 24 hours.  A responsible adult should be with you for the first 24 hours after you arrive home. Do not make any important legal decisions or sign legal papers for 24 hours.  Do not drink alcohol for 24 hours.    Metformin or any medications containing Metformin should not be taken for 48 hours after your procedure.      Call Your Doctor if:   You have unusual pain at the radial/ulnar (wrist) site.  You have redness, warmth, swelling, or pain at the  radial/ulnar (wrist) site.  You have drainage (other than a small amount of blood on the dressing).  `You have chills or a fever > 101.  Your arm becomes pale or dark, cool, tingly, or numb.  You develop chest pain, shortness of breath, feel faint or pass out.    You have heavy bleeding from the site, hold pressure on the site for 20 minutes.  If the bleeding stops, apply a fresh bandage and call your cardiologist.  However, if you        continue to have bleeding, call 911 and continue to apply pressure to the site.   You have any symptoms of a stroke.  Remember BE FAST  B-balance. Sudden trouble walking or loss of balance.  E-eyes.  Sudden changes in how you see or a sudden onset of a very bad headache.   F-face. Sudden weakness or loss of feeling of the face or facial droop on one side.   A-arms Sudden weakness or numbness in one arm.  One arm drifts down if they are both held out in front of you. This happens suddenly and usually on one side of the body.   S-speech.  Sudden trouble speaking, slurred speech or trouble understanding what are saying.   T-time  Time to call emergency services.  Write down the symptoms and the time they started.

## 2022-04-25 ENCOUNTER — TELEPHONE (OUTPATIENT)
Dept: CARDIOLOGY | Facility: CLINIC | Age: 62
End: 2022-04-25

## 2022-04-25 NOTE — TELEPHONE ENCOUNTER
S/w patient.   RCA left and unchanged since 2019. Discussed he may return to work.  Will discuss antianginals at upcoming office visit.

## 2022-04-25 NOTE — TELEPHONE ENCOUNTER
----- Message from Oralia Marie sent at 4/22/2022 10:51 AM EDT -----  Pt is scheduled to see you 05/06, The patient is wanting to know if he can return to work now or does he need to wait until after he sees you? He said he isn't feeling any different, still the same since the last visit.    Michele Dodson            ----- Message -----  From: Yaneli Dangelo APRN  Sent: 4/21/2022   2:02 PM EDT  To: Roney Castillo [facility] Referral Coordinator    Please get him in for cath follow up with me in 2-4 weeks

## 2022-04-25 NOTE — TELEPHONE ENCOUNTER
Called and left message for patient OK per KITA Goel to return to work.  I asked that he contact us should he need a work release or have questions regarding this.  / NOA

## 2022-04-29 NOTE — INTERVAL H&P NOTE
Known coronary disease recurrent symptoms ongoing tobacco abuse abnormal stress test and referred for cath.  H&P reviewed. The patient was examined and there are no changes to the H&P. I have explained the risks and benefits of the procedure to the patient.  The patient understands and agrees to proceed        SCD 's bilateral

## 2022-05-06 ENCOUNTER — OFFICE VISIT (OUTPATIENT)
Dept: CARDIOLOGY | Facility: CLINIC | Age: 62
End: 2022-05-06

## 2022-05-06 VITALS
SYSTOLIC BLOOD PRESSURE: 130 MMHG | OXYGEN SATURATION: 98 % | DIASTOLIC BLOOD PRESSURE: 80 MMHG | HEART RATE: 55 BPM | HEIGHT: 71 IN | WEIGHT: 222 LBS | BODY MASS INDEX: 31.08 KG/M2

## 2022-05-06 DIAGNOSIS — E78.2 MIXED HYPERLIPIDEMIA: ICD-10-CM

## 2022-05-06 DIAGNOSIS — I10 ESSENTIAL HYPERTENSION: ICD-10-CM

## 2022-05-06 DIAGNOSIS — I25.10 CORONARY ARTERY DISEASE INVOLVING NATIVE CORONARY ARTERY OF NATIVE HEART WITHOUT ANGINA PECTORIS: Primary | ICD-10-CM

## 2022-05-06 PROCEDURE — 99214 OFFICE O/P EST MOD 30 MIN: CPT | Performed by: NURSE PRACTITIONER

## 2022-05-06 RX ORDER — ISOSORBIDE MONONITRATE 30 MG/1
30 TABLET, EXTENDED RELEASE ORAL DAILY
Qty: 30 TABLET | Refills: 5 | Status: SHIPPED | OUTPATIENT
Start: 2022-05-06 | End: 2022-05-13

## 2022-05-06 RX ORDER — AMOXICILLIN AND CLAVULANATE POTASSIUM 875; 125 MG/1; MG/1
1 TABLET, FILM COATED ORAL 2 TIMES DAILY
Qty: 14 TABLET | Refills: 0 | Status: SHIPPED | OUTPATIENT
Start: 2022-05-06 | End: 2022-05-13

## 2022-05-06 NOTE — PROGRESS NOTES
Date of Office Visit: 22  Encounter Provider: KITA Goel  Place of Service: [facility] CARDIOLOGY  Patient Name: Hector Ortega  :1960    Chief Complaint   Patient presents with   • Coronary artery disease involving native coronary artery of   • Shortness of Breath     With exertion   • Dizziness   • Hyperlipidemia   • Hypertension   • Follow-up   :     HPI: Hector Ortega is a 61 y.o. male  with hypertension, hyperlipidemia, coronary artery disease, suspected sleep apnea    He had an abnormal nuclear stress test 2019 with subsequent cardiac catheterization 2019 where he was found to have 95% stenosis of the mid LAD and chronic total occlusion of the proximal segment of the right coronary artery which was filled with left-to-right collaterals.  PCI of the proximal to mid LAD with overlapping drug-eluting stents which were 4.0 x 12 mm and 3.5 x 38 mm postdilated to high pressure.  Medical management for chronic RCA  was recommended.  Perfusion stress test 2022 showed medium size, mild severity of ischemia located in the inferior wall.  Patient had chest discomfort, dizziness and shortness of breath during the stress test with 3 out of 10 burning sensation in chest.  Cardiac catheterization showed normal left main, proximal and normal LAD with large stent that was normal.  The diagonals were free of disease with brisk left-to-right collaterals.  The right coronary artery had 100% occlusion with collateral flow proximally.  It was recommended that he stop smoking and try antianginals.    He presents today for reassessment.  He is back to work.  He works in shipping packages and only has dizziness when he bends over and rises too quickly.  Shortness of breath on occasion such as after walking his dog going up the incline of his driveway.  He denies any chest pain at this time.  He states when his symptoms started he thought he had an upper  "respiratory infection.  He has had an intermittent cough and some morning sputum.  He denies edema, near-syncope or syncope.      No Known Allergies        Family and social history reviewed.     ROS  All other systems were reviewed and are negative          Objective:     Vitals:    05/06/22 0829   BP: 130/80   BP Location: Left arm   Patient Position: Sitting   Pulse: 55   SpO2: 98%   Weight: 101 kg (222 lb)   Height: 180.3 cm (71\")     Body mass index is 30.96 kg/m².    PHYSICAL EXAM:  Pulmonary:      Effort: Pulmonary effort is normal.      Breath sounds: Examination of the left-middle field reveals rhonchi. Examination of the left-lower field reveals rhonchi. Rhonchi present.      Comments: a  Cardiovascular:      Normal rate. Regular rhythm.         Procedures      Current Outpatient Medications   Medication Sig Dispense Refill   • amLODIPine (NORVASC) 5 MG tablet Take 5 mg by mouth Daily.     • Cyanocobalamin ([employer]S VITAMIN B-12 PO) Take  by mouth.     • Flavoring Agent (PEPPERMINT FLAVOR) oil Take 1 capsule by mouth daily.     • KRILL OIL PO Take 1,000 mg by mouth Daily.     • levothyroxine (SYNTHROID, LEVOTHROID) 75 MCG tablet Take 75 mcg by mouth Daily.     • Livalo 2 MG tablet tablet Take one tablet by mouth Monday, Wednesday and Friday     • metoprolol succinate XL (TOPROL-XL) 25 MG 24 hr tablet TAKE ONE TABLET BY MOUTH DAILY 90 tablet 3   • MILK THISTLE PO Take 175 mg by mouth Daily.     • ticagrelor (Brilinta) 60 MG tablet tablet Take 1 tablet by mouth 2 (Two) Times a Day. 180 tablet 3     No current facility-administered medications for this visit.     Assessment:       Diagnosis Plan   1. Coronary artery disease involving native coronary artery of native heart without angina pectoris     2. Mixed hyperlipidemia     3. Essential hypertension          No orders of the defined types were placed in this encounter.        Plan:       1.  61-year-old gentleman with coronary artery disease, mid LAD stenting " x2 in 2019 with evidence of chronic totally occluded right coronary artery at that time with brisk left-to-right collaterals.  In mild inferior ischemia on stress test April 2022 status post coronary angiography showing patent LAD stents, chronic totally occluded right coronary artery unchanged and no other significant disease.  We will try addition of isosorbide 30 mg daily   2.  Hypertension blood pressure appears adequate  3 per hyperlipidemia.  He had elevated liver enzymes with atorvastatin 20-40 mg.  Last LDL 96.  Target LDL 70 or less.  He is on Livalo 2 mg 3 times a week  4.  Hypothyroidism on replacement therapy  5.  Suspect sleep apnea never had home sleep study completed August 2020  6.  Prediabetes  7.  Abnormal left lung sounds with occasional cough and morning sputum.  Discussed need to stop smoking and educated him Augmentin for 1 week.  Advised if his respiratory symptoms do not improve to touch base with his PCP  8.  History of mild emphysema on CT 2021  9.  Current tobacco smoker advised to quit.  He and his wife plan to stop smoking after they go on their trip at the end of the month  Follow-up in 6 weeks with me to determine if we will try continue isosorbide after treating possible upper respiratory infection            It has been a pleasure to participate in this patient's care.      Thank you,  KITA Goel      **I used Dragon to dictate this note:**

## 2022-05-13 ENCOUNTER — TELEPHONE (OUTPATIENT)
Dept: CARDIOLOGY | Facility: CLINIC | Age: 62
End: 2022-05-13

## 2022-05-13 RX ORDER — RANOLAZINE 500 MG/1
500 TABLET, EXTENDED RELEASE ORAL 2 TIMES DAILY
Qty: 60 TABLET | Refills: 6 | Status: SHIPPED | OUTPATIENT
Start: 2022-05-13 | End: 2022-12-20

## 2022-05-13 NOTE — TELEPHONE ENCOUNTER
Just to make you aware:     Thank you,  Ángela Barrow RN  Triage Nurse [facility] [place]

## 2022-05-13 NOTE — TELEPHONE ENCOUNTER
Reviewed recommendations with Hector Ortega and the patient verbalized understanding of the recommendations.    Patient stated he was not having chest pain, and since starting the medication, his shortness of breath has improved.  Patient stated he is open to trying the Ranexa.  Please send to [facility] on [address]    Please let me know how you would like to proceed.    Thank you,  Ángela Barrow, RN  [place] Nurse [facility] [place]

## 2022-05-13 NOTE — TELEPHONE ENCOUNTER
"Hector MICKY Bobbronson called to report that he has been experiencing \"tension headaches\" that start in the back of his neck and extend up into his head, since starting the isosorbide.  Patient rates pain 6 or 7 out of 10.    Patient has not been checking BP/HR and is not able to check right now.  Requested patient check BP/HR and send in [employer] message.  Patient stated he will.    Patient is asking what medication he can take to get relief from the tension headaches he is experiencing?    Please let me know how you would like to proceed.    Thank you,  Ángela Barrow, RN  Triage Nurse [facility] [place]  "

## 2022-05-13 NOTE — TELEPHONE ENCOUNTER
I recommend that he stop the Imdur.  In reading Yaneli's last note it does not look like he was having much for any chest pain.  He was having some shortness of breath with some forms of exertion.  Has he noticed any difference since starting the Imdur as far as his shortness of breath?  There are other antianginal medicines we could try since this gave him a headache.  I am thinking specifically of Ranexa

## 2022-06-01 NOTE — TELEPHONE ENCOUNTER
Rx Refill Note  Requested Prescriptions     Pending Prescriptions Disp Refills   • ticagrelor (Brilinta) 60 MG tablet tablet 180 tablet 3     Sig: Take 1 tablet by mouth 2 (Two) Times a Day.      Last office visit with prescribing clinician: 4/5/2022      Next office visit with prescribing clinician: 4/6/2023            Sulma Vigil MA  06/01/22, 11:12 EDT

## 2022-06-20 ENCOUNTER — OFFICE VISIT (OUTPATIENT)
Dept: CARDIOLOGY | Facility: CLINIC | Age: 62
End: 2022-06-20

## 2022-06-20 VITALS
HEART RATE: 50 BPM | BODY MASS INDEX: 31.02 KG/M2 | SYSTOLIC BLOOD PRESSURE: 128 MMHG | DIASTOLIC BLOOD PRESSURE: 80 MMHG | WEIGHT: 221.6 LBS | HEIGHT: 71 IN

## 2022-06-20 DIAGNOSIS — I25.10 CORONARY ARTERY DISEASE INVOLVING NATIVE CORONARY ARTERY OF NATIVE HEART WITHOUT ANGINA PECTORIS: Primary | ICD-10-CM

## 2022-06-20 PROCEDURE — 99214 OFFICE O/P EST MOD 30 MIN: CPT | Performed by: NURSE PRACTITIONER

## 2022-06-20 PROCEDURE — 93000 ELECTROCARDIOGRAM COMPLETE: CPT | Performed by: NURSE PRACTITIONER

## 2022-06-20 NOTE — PROGRESS NOTES
Date of Office Visit: 22  Encounter Provider: KITA Goel  Place of Service: [facility] CARDIOLOGY  Patient Name: Hector Ortega  :1960    Chief Complaint   Patient presents with   • Coronary Artery Disease   :     HPI: Hector Ortega is a 61 y.o. male  with hypertension, hyperlipidemia, coronary artery disease, suspected sleep apnea     He is followed by Dr. Sorto. I will visit with him in follow up today and have reviewed his medical record.   He had an abnormal nuclear stress test 2019 with subsequent cardiac catheterization 2019 where he was found to have 95% stenosis of the mid LAD and chronic total occlusion of the proximal segment of the right coronary artery which was filled with left-to-right collaterals.  PCI of the proximal to mid LAD with overlapping drug-eluting stents which were 4.0 x 12 mm and 3.5 x 38 mm postdilated to high pressure.  Medical management for chronic RCA  was recommended.  Perfusion stress test 2022 showed medium size, mild severity of ischemia located in the inferior wall.  Patient had chest discomfort, dizziness and shortness of breath during the stress test with 3 out of 10 burning sensation in chest.  Cardiac catheterization showed normal left main, proximal and normal LAD with large stent that was normal.  The diagonals were free of disease with brisk left-to-right collaterals.  The right coronary artery had 100% occlusion with collateral flow proximally.  It was recommended that he stop smoking and try isosorbide but it gave him a headache. He was switched to ranexa.    He presents today and denies chest pain.  He is overall feeling well.  Unfortunately, he continues to smoke.  He is not performing any exercise.  He has no dizziness or palpitations or edema.              No Known Allergies        Family and social history reviewed.     ROS  All other systems were reviewed and are negative          Objective:  "    Vitals:    06/20/22 0859   BP: 128/80   BP Location: Left arm   Pulse: 50   Weight: 101 kg (221 lb 9.6 oz)   Height: 180.3 cm (71\")     Body mass index is 30.91 kg/m².    PHYSICAL EXAM:  Cardiovascular:      Normal rate. Regular rhythm.           ECG 12 Lead    Date/Time: 6/20/2022 9:13 AM  Performed by: Yaneli Dangelo APRN  Authorized by: Yaneli Dangelo APRN   Comparison: compared with previous ECG   Similar to previous ECG  Rhythm: sinus rhythm  Rate: normal  QRS axis: normal  Comments: No changes              Current Outpatient Medications   Medication Sig Dispense Refill   • amLODIPine (NORVASC) 5 MG tablet Take 5 mg by mouth Daily.     • Cyanocobalamin ([employer]S VITAMIN B-12 PO) Take  by mouth.     • Flavoring Agent (PEPPERMINT FLAVOR) oil Take 1 capsule by mouth daily.     • KRILL OIL PO Take 1,000 mg by mouth Daily.     • levothyroxine (SYNTHROID, LEVOTHROID) 75 MCG tablet Take 75 mcg by mouth Daily.     • Livalo 2 MG tablet tablet Take one tablet by mouth Monday, Wednesday and Friday     • metoprolol succinate XL (TOPROL-XL) 25 MG 24 hr tablet TAKE ONE TABLET BY MOUTH DAILY 90 tablet 3   • MILK THISTLE PO Take 175 mg by mouth Daily.     • ranolazine (Ranexa) 500 MG 12 hr tablet Take 1 tablet by mouth 2 (Two) Times a Day. 60 tablet 6   • ticagrelor (Brilinta) 60 MG tablet tablet Take 1 tablet by mouth 2 (Two) Times a Day. 180 tablet 3     No current facility-administered medications for this visit.     Assessment:      No diagnosis found.     No orders of the defined types were placed in this encounter.        Plan:           1.  61-year-old gentleman with coronary artery disease, mid LAD stenting x2 in 2019 with evidence of chronic totally occluded right coronary artery at that time with brisk left-to-right collaterals.  In mild inferior ischemia on stress test April 2022 status post coronary angiography showing patent LAD stents, chronic totally occluded right coronary artery unchanged and no other " significant disease.  he had a headache with isosorbide. He is tolerating Ranexa  2.  Hypertension blood pressure appears adequate  3 per hyperlipidemia.  He had elevated liver enzymes with atorvastatin 20-40 mg.  Last LDL 96.  Target LDL 70 or less.  He is on Livalo 2 mg 3 times a week  4.  Hypothyroidism on replacement therapy  5.  Suspect sleep apnea never had home sleep study completed August 2020  6.  Prediabetes  7.  History of mild emphysema on CT 2021  8.  Current tobacco smoker advised to quit. No desire to quit.      Call with questions or concerns.               It has been a pleasure to participate in this patient's care.      Thank you,  KITA Goel      **I used Dragon to dictate this note:**

## 2022-09-09 ENCOUNTER — TELEPHONE (OUTPATIENT)
Dept: CARDIOLOGY | Facility: CLINIC | Age: 62
End: 2022-09-09

## 2022-09-09 ENCOUNTER — PATIENT MESSAGE (OUTPATIENT)
Dept: CARDIOLOGY | Facility: CLINIC | Age: 62
End: 2022-09-09

## 2022-09-09 NOTE — TELEPHONE ENCOUNTER
Analilia in Medical records received call from Kenisha at [facility] for surgical clearance for Mr Ortega. Attempted to call Kenisha back and get more information however she was out for the day. Left patient a [employer] message requesting him to please let us know what type of surgery he will be having, who the performing MD is and which location will the procedure take place.     As of right now the surgery is scheduled for 10/1/22.

## 2022-09-20 ENCOUNTER — TRANSCRIBE ORDERS (OUTPATIENT)
Dept: ADMINISTRATIVE | Facility: HOSPITAL | Age: 62
End: 2022-09-20

## 2022-09-20 DIAGNOSIS — Z12.2 ENCOUNTER FOR SCREENING FOR LUNG CANCER: Primary | ICD-10-CM

## 2022-09-26 ENCOUNTER — HOSPITAL ENCOUNTER (OUTPATIENT)
Dept: CT IMAGING | Facility: HOSPITAL | Age: 62
Discharge: HOME | End: 2022-09-26
Admitting: FAMILY MEDICINE

## 2022-09-26 DIAGNOSIS — Z12.2 ENCOUNTER FOR SCREENING FOR LUNG CANCER: ICD-10-CM

## 2022-09-26 PROCEDURE — 71271 CT THORAX LUNG CANCER SCR C-: CPT

## 2022-12-20 RX ORDER — RANOLAZINE 500 MG/1
TABLET, EXTENDED RELEASE ORAL
Qty: 60 TABLET | Refills: 6 | Status: SHIPPED | OUTPATIENT
Start: 2022-12-20

## 2022-12-20 NOTE — TELEPHONE ENCOUNTER
Rx Refill Note  Requested Prescriptions     Pending Prescriptions Disp Refills    ranolazine (RANEXA) 500 MG 12 hr tablet [Pharmacy Med Name: RANOLAZINE  MG TABLET] 60 tablet 6     Sig: TAKE ONE TABLET BY MOUTH TWICE A DAY      Last office visit with prescribing clinician: 4/5/2022   Last telemedicine visit with prescribing clinician: Visit date not found   Next office visit with prescribing clinician: 4/6/2023                         Would you like a call back once the refill request has been completed: [] Yes [x] No    If the office needs to give you a call back, can they leave a voicemail: [x] Yes [] No    Sulma Vigil MA  12/20/22, 11:30 EST

## 2023-01-08 DIAGNOSIS — I25.10 CORONARY ARTERY DISEASE INVOLVING NATIVE CORONARY ARTERY OF NATIVE HEART WITHOUT ANGINA PECTORIS: ICD-10-CM

## 2023-01-08 DIAGNOSIS — I10 ESSENTIAL HYPERTENSION: ICD-10-CM

## 2023-01-09 RX ORDER — METOPROLOL SUCCINATE 25 MG/1
TABLET, EXTENDED RELEASE ORAL
Qty: 90 TABLET | Refills: 3 | Status: SHIPPED | OUTPATIENT
Start: 2023-01-09

## 2023-04-06 ENCOUNTER — OFFICE VISIT (OUTPATIENT)
Dept: CARDIOLOGY | Facility: CLINIC | Age: 63
End: 2023-04-06
Payer: COMMERCIAL

## 2023-04-06 VITALS
SYSTOLIC BLOOD PRESSURE: 120 MMHG | HEIGHT: 71 IN | WEIGHT: 233 LBS | HEART RATE: 56 BPM | BODY MASS INDEX: 32.62 KG/M2 | RESPIRATION RATE: 18 BRPM | DIASTOLIC BLOOD PRESSURE: 80 MMHG | OXYGEN SATURATION: 100 %

## 2023-04-06 DIAGNOSIS — E78.5 HYPERLIPIDEMIA, UNSPECIFIED HYPERLIPIDEMIA TYPE: ICD-10-CM

## 2023-04-06 DIAGNOSIS — I10 ESSENTIAL HYPERTENSION: ICD-10-CM

## 2023-04-06 DIAGNOSIS — Z72.0 TOBACCO USE: ICD-10-CM

## 2023-04-06 DIAGNOSIS — I25.10 CORONARY ARTERY DISEASE INVOLVING NATIVE CORONARY ARTERY OF NATIVE HEART WITHOUT ANGINA PECTORIS: Primary | ICD-10-CM

## 2023-04-06 PROCEDURE — 93000 ELECTROCARDIOGRAM COMPLETE: CPT | Performed by: INTERNAL MEDICINE

## 2023-04-06 PROCEDURE — 99214 OFFICE O/P EST MOD 30 MIN: CPT | Performed by: INTERNAL MEDICINE

## 2023-04-06 RX ORDER — PITAVASTATIN CALCIUM 2.09 MG/1
2 TABLET, FILM COATED ORAL DAILY
Qty: 90 TABLET | Refills: 3 | Status: SHIPPED | OUTPATIENT
Start: 2023-04-06

## 2023-04-06 NOTE — PROGRESS NOTES
CARDIOLOGY    Bettina Sorto MD    ENCOUNTER DATE:  04/06/2023    Hector Ortega / 62 y.o. / male        CHIEF COMPLAINT / REASON FOR OFFICE VISIT     Heart Problem (Yearly follow up)      HISTORY OF PRESENT ILLNESS       HPI    Hector Ortega is a 62 y.o. male     This gentleman has hyperlipidemia and family history of premature coronary artery disease, and a history of smoking.  I saw him in 08/2019 and he was complaining of chest pain with shortness of breath on exertion. He had a heart catheterization which showed a 95% mid LAD stenosis with overall normal LV function. He had drug-eluting stents placed to the LAD.  He was complaining of some chest discomfort when I saw him in April 2022.  He had a nuclear stress test in April 2022 which showed a medium sized area of severe ischemia in the inferior wall with an ejection fraction of 56%.  He went on to have a heart catheterization in April 2022.  This showed a normal left main, left anterior descending artery with a patent stent and normal circumflex.  The right coronary artery was 100% occluded with right to right collaterals proximally.  Dr. Cox did not recommend trying to open this chronic total occlusion unless medical therapy was unsuccessful.  He had a headache with isosorbide mononitrate.  He had elevated liver enzymes on atorvastatin.    He is not having any chest pain.  He does have shortness of breath with exertion.  He has some lightheadedness.    REVIEW OF SYSTEMS     Review of Systems   Constitutional: Negative for chills, fever, weight gain and weight loss.   Cardiovascular: Negative for leg swelling.   Respiratory: Positive for wheezing. Negative for cough and snoring.    Hematologic/Lymphatic: Negative for bleeding problem. Does not bruise/bleed easily.   Skin: Negative for color change.   Musculoskeletal: Negative for falls, joint pain and myalgias.   Gastrointestinal: Negative for melena.   Genitourinary: Negative for hematuria.  "  Neurological: Negative for excessive daytime sleepiness.   Psychiatric/Behavioral: Negative for depression. The patient is not nervous/anxious.          VITAL SIGNS     Visit Vitals  /80 (BP Location: Right arm, Patient Position: Sitting, Cuff Size: Adult)   Pulse 56   Resp 18   Ht 180.3 cm (71\")   Wt 106 kg (233 lb)   SpO2 100%   BMI 32.50 kg/m²         Wt Readings from Last 3 Encounters:   04/06/23 106 kg (233 lb)   06/20/22 101 kg (221 lb 9.6 oz)   05/06/22 101 kg (222 lb)     Body mass index is 32.5 kg/m².      PHYSICAL EXAMINATION     Constitutional:       General: Not in acute distress.  Neck:      Vascular: No carotid bruit or JVD.   Pulmonary:      Effort: Pulmonary effort is normal.      Breath sounds: Normal breath sounds.   Cardiovascular:      Normal rate. Regular rhythm.      Murmurs: There is no murmur.   Psychiatric:         Mood and Affect: Mood and affect normal.           REVIEWED DATA       ECG 12 Lead    Date/Time: 4/6/2023 9:14 AM  Performed by: Bettina Sorto MD  Authorized by: Bettina Sorto MD   Comparison: compared with previous ECG from 6/20/2022  Similar to previous ECG  Rhythm: sinus rhythm  BPM: 56  Conduction: conduction normal  ST Segments: ST segments normal  T Waves: T waves normal    Clinical impression: normal ECG                  Lab Results   Component Value Date    GLUCOSE 133 (H) 04/19/2022    BUN 14 04/19/2022    CREATININE 1.08 04/19/2022    EGFR 79.1 04/19/2022    BCR 13.0 04/19/2022    K 4.5 04/19/2022    CO2 25.0 04/19/2022    CALCIUM 9.4 04/19/2022    ALBUMIN 4.50 08/14/2020    BILITOT 0.7 08/14/2020    AST 32 08/14/2020    ALT 57 (H) 08/14/2020       ASSESSMENT & PLAN      Diagnosis Plan   1. Coronary artery disease involving native coronary artery of native heart without angina pectoris        2. Essential hypertension        3. Hyperlipidemia, unspecified hyperlipidemia type        4. Tobacco use            1.  Coronary artery disease.  He had a prior " stent to the LAD and has a chronic total occlusion of the right coronary artery with a mild amount of ischemia on a nuclear stress test from April 2022.  Medical management was recommended.  He is on amlodipine, metoprolol and Ranexa.  He is also on Brilinta 60 mg twice daily.  2.  Hyperlipidemia.  I reviewed labs from September 2022 and his LDL was 98.  Goal LDL is less than 70.  He did not tolerate atorvastatin because of elevated liver enzymes.  He takes Livalo 3 times a week.  3.  Hypertension.  Blood pressure is at goal today.  4.  Tobacco use.  We discussed quitting smoking today.  He would like to.  5.  Hypothyroid on replacement  6.  Prediabetes.  Hemoglobin A1c of 6.2.  7.  Mild emphysema on CT in 2021.    Follow-up in 1 year unless his symptoms change sooner.      Orders Placed This Encounter   Procedures   • ECG 12 Lead     This order was created via procedure documentation     Order Specific Question:   Release to patient     Answer:   Routine Release           MEDICATIONS         Discharge Medications          Accurate as of April 6, 2023  9:14 AM. If you have any questions, ask your nurse or doctor.            Changes to Medications      Instructions Start Date   Livalo 2 MG tablet tablet  Generic drug: pitavastatin calcium  What changed:   · how much to take  · how to take this  · when to take this  Changed by: Bettina Sorto MD   2 mg, Oral, Daily, Take one tablet by mouth Monday, Wednesday and Friday         Continue These Medications      Instructions Start Date   amLODIPine 5 MG tablet  Commonly known as: NORVASC   5 mg, Oral, Daily      CVS VITAMIN B-12 PO   Oral      levothyroxine 75 MCG tablet  Commonly known as: SYNTHROID, LEVOTHROID   75 mcg, Oral, Daily      metoprolol succinate XL 25 MG 24 hr tablet  Commonly known as: TOPROL-XL   TAKE ONE TABLET BY MOUTH DAILY      MILK THISTLE PO   175 mg, Oral, Daily      Peppermint Flavor oil   1 capsule, Oral, Daily      ranolazine 500 MG 12 hr  tablet  Commonly known as: RANEXA   TAKE ONE TABLET BY MOUTH TWICE A DAY      ticagrelor 60 MG tablet tablet  Commonly known as: Brilinta   60 mg, Oral, 2 Times Daily               Bettina Sorto MD  04/06/23  09:14 EDT    Part of this note may be an electronic transcription/translation of spoken language to printed text using the Dragon dictation system.

## 2023-06-13 ENCOUNTER — HOSPITAL ENCOUNTER (OUTPATIENT)
Dept: GENERAL RADIOLOGY | Facility: HOSPITAL | Age: 63
Discharge: HOME | End: 2023-06-13
Admitting: FAMILY MEDICINE
Payer: COMMERCIAL

## 2023-06-13 ENCOUNTER — TRANSCRIBE ORDERS (OUTPATIENT)
Dept: GENERAL RADIOLOGY | Facility: HOSPITAL | Age: 63
End: 2023-06-13
Payer: COMMERCIAL

## 2023-06-13 DIAGNOSIS — R06.00 DYSPNEA, UNSPECIFIED TYPE: ICD-10-CM

## 2023-06-13 DIAGNOSIS — R05.9 COUGH, UNSPECIFIED TYPE: ICD-10-CM

## 2023-06-13 DIAGNOSIS — R06.2 WHEEZING: ICD-10-CM

## 2023-06-13 DIAGNOSIS — R06.00 DYSPNEA, UNSPECIFIED TYPE: Primary | ICD-10-CM

## 2023-06-13 PROCEDURE — 71046 X-RAY EXAM CHEST 2 VIEWS: CPT

## 2023-08-18 RX ORDER — RANOLAZINE 500 MG/1
TABLET, EXTENDED RELEASE ORAL
Qty: 180 TABLET | Refills: 3 | Status: SHIPPED | OUTPATIENT
Start: 2023-08-18

## 2023-09-28 ENCOUNTER — TRANSCRIBE ORDERS (OUTPATIENT)
Dept: CT IMAGING | Facility: HOSPITAL | Age: 63
End: 2023-09-28
Payer: COMMERCIAL

## 2023-09-28 DIAGNOSIS — F17.210 CIGARETTE SMOKER: Primary | ICD-10-CM

## 2023-10-05 ENCOUNTER — LAB (OUTPATIENT)
Dept: LAB | Facility: HOSPITAL | Age: 63
End: 2023-10-05
Payer: COMMERCIAL

## 2023-10-05 ENCOUNTER — HOSPITAL ENCOUNTER (OUTPATIENT)
Dept: CT IMAGING | Facility: HOSPITAL | Age: 63
Discharge: HOME | End: 2023-10-05
Payer: COMMERCIAL

## 2023-10-05 ENCOUNTER — TRANSCRIBE ORDERS (OUTPATIENT)
Dept: LAB | Facility: HOSPITAL | Age: 63
End: 2023-10-05
Payer: COMMERCIAL

## 2023-10-05 DIAGNOSIS — F17.210 CIGARETTE SMOKER: ICD-10-CM

## 2023-10-05 DIAGNOSIS — E55.9 VITAMIN D DEFICIENCY: ICD-10-CM

## 2023-10-05 DIAGNOSIS — Z00.00 ROUTINE GENERAL MEDICAL EXAMINATION AT A HEALTH CARE FACILITY: Primary | ICD-10-CM

## 2023-10-05 DIAGNOSIS — Z00.00 ROUTINE GENERAL MEDICAL EXAMINATION AT A HEALTH CARE FACILITY: ICD-10-CM

## 2023-10-05 DIAGNOSIS — Z12.5 SPECIAL SCREENING FOR MALIGNANT NEOPLASM OF PROSTATE: ICD-10-CM

## 2023-10-05 LAB
25(OH)D3 SERPL-MCNC: 35.6 NG/ML (ref 30–100)
ALBUMIN SERPL-MCNC: 4.5 G/DL (ref 3.5–5.2)
ALBUMIN/GLOB SERPL: 1.6 G/DL
ALP SERPL-CCNC: 62 U/L (ref 39–117)
ALT SERPL W P-5'-P-CCNC: 43 U/L (ref 1–41)
ANION GAP SERPL CALCULATED.3IONS-SCNC: 10.2 MMOL/L (ref 5–15)
AST SERPL-CCNC: 27 U/L (ref 1–40)
BASOPHILS # BLD AUTO: 0.05 10*3/MM3 (ref 0–0.2)
BASOPHILS NFR BLD AUTO: 0.6 % (ref 0–1.5)
BILIRUB SERPL-MCNC: 0.5 MG/DL (ref 0–1.2)
BUN SERPL-MCNC: 18 MG/DL (ref 8–23)
BUN/CREAT SERPL: 15.5 (ref 7–25)
CALCIUM SPEC-SCNC: 9.7 MG/DL (ref 8.6–10.5)
CHLORIDE SERPL-SCNC: 103 MMOL/L (ref 98–107)
CHOLEST SERPL-MCNC: 177 MG/DL (ref 0–200)
CO2 SERPL-SCNC: 25.8 MMOL/L (ref 22–29)
CREAT SERPL-MCNC: 1.16 MG/DL (ref 0.76–1.27)
DEPRECATED RDW RBC AUTO: 42.4 FL (ref 37–54)
EGFRCR SERPLBLD CKD-EPI 2021: 72.1 ML/MIN/1.73
EOSINOPHIL # BLD AUTO: 0.35 10*3/MM3 (ref 0–0.4)
EOSINOPHIL NFR BLD AUTO: 4.4 % (ref 0.3–6.2)
ERYTHROCYTE [DISTWIDTH] IN BLOOD BY AUTOMATED COUNT: 11.8 % (ref 12.3–15.4)
GLOBULIN UR ELPH-MCNC: 2.8 GM/DL
GLUCOSE SERPL-MCNC: 113 MG/DL (ref 65–99)
HBA1C MFR BLD: 6.4 % (ref 4.8–5.6)
HCT VFR BLD AUTO: 45.6 % (ref 37.5–51)
HDLC SERPL-MCNC: 25 MG/DL (ref 40–60)
HGB BLD-MCNC: 16 G/DL (ref 13–17.7)
IMM GRANULOCYTES # BLD AUTO: 0.03 10*3/MM3 (ref 0–0.05)
IMM GRANULOCYTES NFR BLD AUTO: 0.4 % (ref 0–0.5)
LDLC SERPL CALC-MCNC: 100 MG/DL (ref 0–100)
LDLC/HDLC SERPL: 3.62 {RATIO}
LYMPHOCYTES # BLD AUTO: 2.39 10*3/MM3 (ref 0.7–3.1)
LYMPHOCYTES NFR BLD AUTO: 29.8 % (ref 19.6–45.3)
MCH RBC QN AUTO: 34.2 PG (ref 26.6–33)
MCHC RBC AUTO-ENTMCNC: 35.1 G/DL (ref 31.5–35.7)
MCV RBC AUTO: 97.4 FL (ref 79–97)
MONOCYTES # BLD AUTO: 1.05 10*3/MM3 (ref 0.1–0.9)
MONOCYTES NFR BLD AUTO: 13.1 % (ref 5–12)
NEUTROPHILS NFR BLD AUTO: 4.16 10*3/MM3 (ref 1.7–7)
NEUTROPHILS NFR BLD AUTO: 51.7 % (ref 42.7–76)
NRBC BLD AUTO-RTO: 0 /100 WBC (ref 0–0.2)
PLATELET # BLD AUTO: 176 10*3/MM3 (ref 140–450)
PMV BLD AUTO: 10.9 FL (ref 6–12)
POTASSIUM SERPL-SCNC: 4.1 MMOL/L (ref 3.5–5.2)
PROT SERPL-MCNC: 7.3 G/DL (ref 6–8.5)
PSA SERPL-MCNC: 0.79 NG/ML (ref 0–4)
RBC # BLD AUTO: 4.68 10*6/MM3 (ref 4.14–5.8)
SODIUM SERPL-SCNC: 139 MMOL/L (ref 136–145)
TRIGL SERPL-MCNC: 307 MG/DL (ref 0–150)
TSH SERPL DL<=0.05 MIU/L-ACNC: 1.56 UIU/ML (ref 0.27–4.2)
VIT B12 BLD-MCNC: >2000 PG/ML (ref 211–946)
VLDLC SERPL-MCNC: 52 MG/DL (ref 5–40)
WBC NRBC COR # BLD: 8.03 10*3/MM3 (ref 3.4–10.8)

## 2023-10-05 PROCEDURE — 82306 VITAMIN D 25 HYDROXY: CPT

## 2023-10-05 PROCEDURE — 36415 COLL VENOUS BLD VENIPUNCTURE: CPT

## 2023-10-05 PROCEDURE — 80050 GENERAL HEALTH PANEL: CPT

## 2023-10-05 PROCEDURE — 80061 LIPID PANEL: CPT

## 2023-10-05 PROCEDURE — 82607 VITAMIN B-12: CPT

## 2023-10-05 PROCEDURE — G0103 PSA SCREENING: HCPCS

## 2023-10-05 PROCEDURE — 71271 CT THORAX LUNG CANCER SCR C-: CPT

## 2023-10-05 PROCEDURE — 83036 HEMOGLOBIN GLYCOSYLATED A1C: CPT

## 2024-01-15 DIAGNOSIS — I25.10 CORONARY ARTERY DISEASE INVOLVING NATIVE CORONARY ARTERY OF NATIVE HEART WITHOUT ANGINA PECTORIS: ICD-10-CM

## 2024-01-15 DIAGNOSIS — I10 ESSENTIAL HYPERTENSION: ICD-10-CM

## 2024-01-16 RX ORDER — METOPROLOL SUCCINATE 25 MG/1
TABLET, EXTENDED RELEASE ORAL
Qty: 90 TABLET | Refills: 3 | Status: SHIPPED | OUTPATIENT
Start: 2024-01-16

## 2024-04-08 ENCOUNTER — OFFICE VISIT (OUTPATIENT)
Dept: CARDIOLOGY | Facility: CLINIC | Age: 64
End: 2024-04-08
Payer: COMMERCIAL

## 2024-04-08 VITALS
SYSTOLIC BLOOD PRESSURE: 140 MMHG | BODY MASS INDEX: 31.22 KG/M2 | WEIGHT: 223 LBS | HEIGHT: 71 IN | DIASTOLIC BLOOD PRESSURE: 90 MMHG | HEART RATE: 50 BPM

## 2024-04-08 DIAGNOSIS — E78.5 HYPERLIPIDEMIA LDL GOAL <70: ICD-10-CM

## 2024-04-08 DIAGNOSIS — I10 ESSENTIAL HYPERTENSION: ICD-10-CM

## 2024-04-08 DIAGNOSIS — I25.10 CORONARY ARTERY DISEASE INVOLVING NATIVE CORONARY ARTERY OF NATIVE HEART WITHOUT ANGINA PECTORIS: Primary | ICD-10-CM

## 2024-04-08 PROCEDURE — 93000 ELECTROCARDIOGRAM COMPLETE: CPT | Performed by: NURSE PRACTITIONER

## 2024-04-08 PROCEDURE — 99214 OFFICE O/P EST MOD 30 MIN: CPT | Performed by: NURSE PRACTITIONER

## 2024-04-08 RX ORDER — PITAVASTATIN CALCIUM 2.09 MG/1
2 TABLET, FILM COATED ORAL 3 TIMES WEEKLY
Qty: 90 TABLET | Refills: 1 | Status: SHIPPED | OUTPATIENT
Start: 2024-04-08

## 2024-04-08 RX ORDER — RANOLAZINE 500 MG/1
500 TABLET, EXTENDED RELEASE ORAL 2 TIMES DAILY
Qty: 180 TABLET | Refills: 3 | Status: SHIPPED | OUTPATIENT
Start: 2024-04-08

## 2024-04-08 NOTE — PROGRESS NOTES
Date of Office Visit: 24  Encounter Provider: KITA Goel  Place of Service: [facility] CARDIOLOGY  Patient Name: Hector Ortega  :1960    Chief Complaint   Patient presents with    Coronary Artery Disease   :     HPI: Hector Ortega is a 63 y.o. male  with  hypertension, hyperlipidemia, coronary artery disease, suspected sleep apnea     He is followed by Dr. Bettina Sorto. I will visit with him in follow up today and have reviewed his medical record.     He had an abnormal nuclear stress test 2019 with subsequent cardiac catheterization 2019 where he was found to have 95% stenosis of the mid LAD and chronic total occlusion of the proximal segment of the right coronary artery which was filled with left-to-right collaterals.  PCI of the proximal to mid LAD with overlapping drug-eluting stents which were 4.0 x 12 mm and 3.5 x 38 mm postdilated to high pressure.  Medical management for chronic RCA  was recommended.  Perfusion stress test 2022 showed medium size, mild severity of ischemia located in the inferior wall.  Patient had chest discomfort, dizziness and shortness of breath during the stress test with 3 out of 10 burning sensation in chest.  Cardiac catheterization showed normal left main, proximal and normal LAD with large stent that was normal.  The diagonals were free of disease with brisk left-to-right collaterals.  The right coronary artery had 100% occlusion with collateral flow proximally.  It was recommended that he stop smoking and try isosorbide but it gave him a headache. He was switched to ranexa.    He presents today for reassessment.  Unfortunately, he continues to smoke about a pack of cigarettes daily.  He has been out of habit of checking his blood pressure at home but states his blood pressure is doing good the last time he was at his PCP office.  He denies chest pain or shortness of breath or edema or dizziness or  "palpitations.  No blood in the urine or stool with Brilinta.  He has lost about 10 pounds over the past year without significant diet changes.  He reportedly is not exercising routinely.  He stays active on the job.          No Known Allergies        Family and social history reviewed.     ROS  All other systems were reviewed and are negative          Objective:     Vitals:    04/08/24 0801   BP: 140/90   BP Location: Left arm   Patient Position: Sitting   Pulse: 50   Weight: 101 kg (223 lb)   Height: 180.3 cm (71\")     Body mass index is 31.1 kg/m².    PHYSICAL EXAM:  Pulmonary:      Effort: Pulmonary effort is normal.      Breath sounds: Normal breath sounds.   Cardiovascular:      Normal rate. Regular rhythm.           ECG 12 Lead    Date/Time: 4/8/2024 8:16 AM  Performed by: Yaneli Dangelo APRN    Authorized by: Yaneli Dangelo APRN  Comparison: compared with previous ECG   Similar to previous ECG  Rhythm: sinus rhythm  Rate: normal  Conduction: 1st degree AV block  T inversion: aVL  QRS axis: normal  Comments: No significant change            Current Outpatient Medications   Medication Sig Dispense Refill    amLODIPine (NORVASC) 5 MG tablet Take 1 tablet by mouth Daily.      levothyroxine (SYNTHROID, LEVOTHROID) 75 MCG tablet Take 1 tablet by mouth Daily.      Livalo 2 MG tablet tablet Take 1 tablet by mouth 3 (Three) Times a Week. 90 tablet 1    metoprolol succinate XL (TOPROL-XL) 25 MG 24 hr tablet TAKE ONE TABLET BY MOUTH DAILY 90 tablet 3    MILK THISTLE PO Take 175 mg by mouth Daily.      ranolazine (RANEXA) 500 MG 12 hr tablet Take 1 tablet by mouth 2 (Two) Times a Day. 180 tablet 3    ticagrelor (Brilinta) 60 MG tablet tablet Take 1 tablet by mouth 2 (Two) Times a Day. 180 tablet 3     No current facility-administered medications for this visit.     Assessment:       Diagnosis Plan   1. Coronary artery disease involving native coronary artery of native heart without angina pectoris        2. Essential " hypertension        3. Hyperlipidemia LDL goal <70             Orders Placed This Encounter   Procedures    ECG 12 Lead     This order was created via procedure documentation     Order Specific Question:   Release to patient     Answer:   Routine Release [[id number]]         Plan:       1.  63-year-old gentleman with coronary artery disease, mid LAD stenting x2 in 2019 with evidence of chronic totally occluded right coronary artery at that time with brisk left-to-right collaterals.  In mild inferior ischemia on stress test April 2022 status post coronary angiography showing patent LAD stents, chronic totally occluded right coronary artery unchanged and no other significant disease.  he had a headache with isosorbide. He is tolerating Ranexa without symptoms  2.  Hypertension blood pressure is elevated today but I have asked him to start following at home.  He states his blood pressure was good the last time he was at his PCP office.  3.  Hyperlipidemia.  He had elevated liver enzymes with atorvastatin.  He has been on Livalo 2 mg 3 times a week.  Reviewed his last lipid panel from October 2023.  Triglycerides are 307, HDL low at 25 and LDL little elevated for him at 100.He tells me that the expense of Levolet has went up significantly.  I have asked him to contact his insurance company to see if a chair exception request would be beneficial for him.  He tells me he will let me know.  4.  Hypothyroidism on replacement therapy  5.  Suspect sleep apnea never had home sleep study completed August 2020  6.  Prediabetes reviewed last hemoglobin A1c of 6.40 October 2023  7.  History of mild emphysema on CT 2021  8.  Current tobacco smoker advised to quit. No desire to quit.  He is smoking about a pack a day  9.  Elevated liver enzymes-ALT was 67 in November 2023 but AST was normal.  Follow-up in 1 year            It has been a pleasure to participate in this patient's care.      Thank you,  KITA Goel      **I  used Fabio to dictate this note:**

## 2024-04-18 ENCOUNTER — TRANSCRIBE ORDERS (OUTPATIENT)
Dept: ADMINISTRATIVE | Facility: HOSPITAL | Age: 64
End: 2024-04-18
Payer: COMMERCIAL

## 2024-04-18 DIAGNOSIS — R79.89 ELEVATED SERUM CREATININE: Primary | ICD-10-CM

## 2024-05-02 ENCOUNTER — HOSPITAL ENCOUNTER (OUTPATIENT)
Dept: ULTRASOUND IMAGING | Facility: HOSPITAL | Age: 64
Discharge: HOME | End: 2024-05-02
Admitting: FAMILY MEDICINE
Payer: COMMERCIAL

## 2024-05-02 DIAGNOSIS — R79.89 ELEVATED SERUM CREATININE: ICD-10-CM

## 2024-05-02 PROCEDURE — 76775 US EXAM ABDO BACK WALL LIM: CPT

## 2024-07-09 ENCOUNTER — LAB (OUTPATIENT)
Dept: LAB | Facility: HOSPITAL | Age: 64
End: 2024-07-09
Payer: COMMERCIAL

## 2024-07-09 ENCOUNTER — HOSPITAL ENCOUNTER (OUTPATIENT)
Dept: GENERAL RADIOLOGY | Facility: HOSPITAL | Age: 64
Discharge: HOME | End: 2024-07-09
Payer: COMMERCIAL

## 2024-07-09 ENCOUNTER — TRANSCRIBE ORDERS (OUTPATIENT)
Dept: ADMINISTRATIVE | Facility: HOSPITAL | Age: 64
End: 2024-07-09
Payer: COMMERCIAL

## 2024-07-09 DIAGNOSIS — R79.89 ELEVATED SERUM CREATININE: ICD-10-CM

## 2024-07-09 DIAGNOSIS — R79.89 ELEVATED SERUM CREATININE: Primary | ICD-10-CM

## 2024-07-09 DIAGNOSIS — M54.2 NECK PAIN ON RIGHT SIDE: ICD-10-CM

## 2024-07-09 LAB
ANION GAP SERPL CALCULATED.3IONS-SCNC: 11.8 MMOL/L (ref 5–15)
BUN SERPL-MCNC: 19 MG/DL (ref 8–23)
BUN/CREAT SERPL: 16.1 (ref 7–25)
CALCIUM SPEC-SCNC: 9.8 MG/DL (ref 8.6–10.5)
CHLORIDE SERPL-SCNC: 101 MMOL/L (ref 98–107)
CO2 SERPL-SCNC: 26.2 MMOL/L (ref 22–29)
CREAT SERPL-MCNC: 1.18 MG/DL (ref 0.76–1.27)
EGFRCR SERPLBLD CKD-EPI 2021: 70.2 ML/MIN/1.73
GLUCOSE SERPL-MCNC: 119 MG/DL (ref 65–99)
POTASSIUM SERPL-SCNC: 4.5 MMOL/L (ref 3.5–5.2)
SODIUM SERPL-SCNC: 139 MMOL/L (ref 136–145)

## 2024-07-09 PROCEDURE — 36415 COLL VENOUS BLD VENIPUNCTURE: CPT

## 2024-07-09 PROCEDURE — 80048 BASIC METABOLIC PNL TOTAL CA: CPT

## 2024-07-09 PROCEDURE — 72050 X-RAY EXAM NECK SPINE 4/5VWS: CPT

## 2024-07-09 PROCEDURE — 73030 X-RAY EXAM OF SHOULDER: CPT

## 2024-10-16 ENCOUNTER — LAB (OUTPATIENT)
Dept: LAB | Facility: HOSPITAL | Age: 64
End: 2024-10-16
Payer: COMMERCIAL

## 2024-10-16 ENCOUNTER — TRANSCRIBE ORDERS (OUTPATIENT)
Dept: ADMINISTRATIVE | Facility: HOSPITAL | Age: 64
End: 2024-10-16
Payer: COMMERCIAL

## 2024-10-16 DIAGNOSIS — Z12.5 SPECIAL SCREENING, PROSTATE CANCER: ICD-10-CM

## 2024-10-16 DIAGNOSIS — Z00.00 ROUTINE GENERAL MEDICAL EXAMINATION AT A HEALTH CARE FACILITY: ICD-10-CM

## 2024-10-16 DIAGNOSIS — E55.9 VITAMIN D DEFICIENCY: ICD-10-CM

## 2024-10-16 DIAGNOSIS — Z00.00 ROUTINE GENERAL MEDICAL EXAMINATION AT A HEALTH CARE FACILITY: Primary | ICD-10-CM

## 2024-10-16 LAB
25(OH)D3 SERPL-MCNC: 32.5 NG/ML (ref 30–100)
ALBUMIN SERPL-MCNC: 4.6 G/DL (ref 3.5–5.2)
ALBUMIN/GLOB SERPL: 1.5 G/DL
ALP SERPL-CCNC: 73 U/L (ref 39–117)
ALT SERPL W P-5'-P-CCNC: 49 U/L (ref 1–41)
ANION GAP SERPL CALCULATED.3IONS-SCNC: 9.1 MMOL/L (ref 5–15)
AST SERPL-CCNC: 27 U/L (ref 1–40)
BASOPHILS # BLD AUTO: 0.02 10*3/MM3 (ref 0–0.2)
BASOPHILS NFR BLD AUTO: 0.3 % (ref 0–1.5)
BILIRUB SERPL-MCNC: 0.5 MG/DL (ref 0–1.2)
BUN SERPL-MCNC: 17 MG/DL (ref 8–23)
BUN/CREAT SERPL: 12.6 (ref 7–25)
CALCIUM SPEC-SCNC: 9.8 MG/DL (ref 8.6–10.5)
CHLORIDE SERPL-SCNC: 102 MMOL/L (ref 98–107)
CHOLEST SERPL-MCNC: 183 MG/DL (ref 0–200)
CO2 SERPL-SCNC: 26.9 MMOL/L (ref 22–29)
CREAT SERPL-MCNC: 1.35 MG/DL (ref 0.76–1.27)
DEPRECATED RDW RBC AUTO: 44 FL (ref 37–54)
EGFRCR SERPLBLD CKD-EPI 2021: 59.7 ML/MIN/1.73
EOSINOPHIL # BLD AUTO: 0.2 10*3/MM3 (ref 0–0.4)
EOSINOPHIL NFR BLD AUTO: 2.9 % (ref 0.3–6.2)
ERYTHROCYTE [DISTWIDTH] IN BLOOD BY AUTOMATED COUNT: 11.9 % (ref 12.3–15.4)
GLOBULIN UR ELPH-MCNC: 3.1 GM/DL
GLUCOSE SERPL-MCNC: 121 MG/DL (ref 65–99)
HBA1C MFR BLD: 6 % (ref 4.8–5.6)
HCT VFR BLD AUTO: 50.4 % (ref 37.5–51)
HDLC SERPL-MCNC: 24 MG/DL (ref 40–60)
HGB BLD-MCNC: 16.9 G/DL (ref 13–17.7)
IMM GRANULOCYTES # BLD AUTO: 0.01 10*3/MM3 (ref 0–0.05)
IMM GRANULOCYTES NFR BLD AUTO: 0.1 % (ref 0–0.5)
LDLC SERPL CALC-MCNC: 111 MG/DL (ref 0–100)
LDLC/HDLC SERPL: 4.32 {RATIO}
LYMPHOCYTES # BLD AUTO: 1.91 10*3/MM3 (ref 0.7–3.1)
LYMPHOCYTES NFR BLD AUTO: 27.6 % (ref 19.6–45.3)
MCH RBC QN AUTO: 33.3 PG (ref 26.6–33)
MCHC RBC AUTO-ENTMCNC: 33.5 G/DL (ref 31.5–35.7)
MCV RBC AUTO: 99.2 FL (ref 79–97)
MONOCYTES # BLD AUTO: 0.99 10*3/MM3 (ref 0.1–0.9)
MONOCYTES NFR BLD AUTO: 14.3 % (ref 5–12)
NEUTROPHILS NFR BLD AUTO: 3.8 10*3/MM3 (ref 1.7–7)
NEUTROPHILS NFR BLD AUTO: 54.8 % (ref 42.7–76)
PLATELET # BLD AUTO: 193 10*3/MM3 (ref 140–450)
PMV BLD AUTO: 10.9 FL (ref 6–12)
POTASSIUM SERPL-SCNC: 4.4 MMOL/L (ref 3.5–5.2)
PROT SERPL-MCNC: 7.7 G/DL (ref 6–8.5)
PSA SERPL-MCNC: 1.23 NG/ML (ref 0–4)
RBC # BLD AUTO: 5.08 10*6/MM3 (ref 4.14–5.8)
SODIUM SERPL-SCNC: 138 MMOL/L (ref 136–145)
TRIGL SERPL-MCNC: 277 MG/DL (ref 0–150)
TSH SERPL DL<=0.05 MIU/L-ACNC: 2.11 UIU/ML (ref 0.27–4.2)
VIT B12 BLD-MCNC: 502 PG/ML (ref 211–946)
VLDLC SERPL-MCNC: 48 MG/DL (ref 5–40)
WBC NRBC COR # BLD AUTO: 6.93 10*3/MM3 (ref 3.4–10.8)

## 2024-10-16 PROCEDURE — 80050 GENERAL HEALTH PANEL: CPT

## 2024-10-16 PROCEDURE — 82607 VITAMIN B-12: CPT

## 2024-10-16 PROCEDURE — 83036 HEMOGLOBIN GLYCOSYLATED A1C: CPT

## 2024-10-16 PROCEDURE — 36415 COLL VENOUS BLD VENIPUNCTURE: CPT

## 2024-10-16 PROCEDURE — G0103 PSA SCREENING: HCPCS

## 2024-10-16 PROCEDURE — 80061 LIPID PANEL: CPT

## 2024-10-16 PROCEDURE — 82306 VITAMIN D 25 HYDROXY: CPT

## 2024-10-22 ENCOUNTER — TRANSCRIBE ORDERS (OUTPATIENT)
Dept: ADMINISTRATIVE | Facility: HOSPITAL | Age: 64
End: 2024-10-22
Payer: COMMERCIAL

## 2024-10-22 DIAGNOSIS — F17.210 CIGARETTE SMOKER: Primary | ICD-10-CM

## 2024-11-08 ENCOUNTER — HOSPITAL ENCOUNTER (OUTPATIENT)
Dept: CT IMAGING | Facility: HOSPITAL | Age: 64
Discharge: HOME | End: 2024-11-08
Admitting: FAMILY MEDICINE
Payer: COMMERCIAL

## 2024-11-08 DIAGNOSIS — F17.210 CIGARETTE SMOKER: ICD-10-CM

## 2024-11-08 PROCEDURE — 71271 CT THORAX LUNG CANCER SCR C-: CPT

## 2025-01-21 DIAGNOSIS — I10 ESSENTIAL HYPERTENSION: ICD-10-CM

## 2025-01-21 DIAGNOSIS — I25.10 CORONARY ARTERY DISEASE INVOLVING NATIVE CORONARY ARTERY OF NATIVE HEART WITHOUT ANGINA PECTORIS: ICD-10-CM

## 2025-01-21 RX ORDER — METOPROLOL SUCCINATE 25 MG/1
25 TABLET, EXTENDED RELEASE ORAL DAILY
Qty: 90 TABLET | Refills: 3 | Status: SHIPPED | OUTPATIENT
Start: 2025-01-21

## 2025-04-17 ENCOUNTER — OFFICE VISIT (OUTPATIENT)
Dept: CARDIOLOGY | Facility: CLINIC | Age: 65
End: 2025-04-17
Payer: COMMERCIAL

## 2025-04-17 VITALS
HEART RATE: 56 BPM | OXYGEN SATURATION: 98 % | SYSTOLIC BLOOD PRESSURE: 126 MMHG | DIASTOLIC BLOOD PRESSURE: 74 MMHG | RESPIRATION RATE: 16 BRPM | BODY MASS INDEX: 32.62 KG/M2 | HEIGHT: 71 IN | WEIGHT: 233 LBS

## 2025-04-17 DIAGNOSIS — I10 ESSENTIAL HYPERTENSION: ICD-10-CM

## 2025-04-17 DIAGNOSIS — I25.10 CORONARY ARTERY DISEASE INVOLVING NATIVE CORONARY ARTERY OF NATIVE HEART WITHOUT ANGINA PECTORIS: Primary | ICD-10-CM

## 2025-04-17 DIAGNOSIS — R94.39 ABNORMAL NUCLEAR STRESS TEST: ICD-10-CM

## 2025-04-17 DIAGNOSIS — E78.2 MIXED HYPERLIPIDEMIA: ICD-10-CM

## 2025-04-17 PROCEDURE — 93000 ELECTROCARDIOGRAM COMPLETE: CPT | Performed by: INTERNAL MEDICINE

## 2025-04-17 PROCEDURE — 99214 OFFICE O/P EST MOD 30 MIN: CPT | Performed by: INTERNAL MEDICINE

## 2025-04-17 RX ORDER — PITAVASTATIN CALCIUM 2.09 MG/1
2 TABLET, FILM COATED ORAL 3 TIMES WEEKLY
COMMUNITY

## 2025-04-17 NOTE — PROGRESS NOTES
"      CARDIOLOGY    Bettina Sorto MD    ENCOUNTER DATE:  04/17/2025    Hector Ortega / 64 y.o. / male        CHIEF COMPLAINT / REASON FOR OFFICE VISIT     Follow-up (1 year follow up/Coronary artery disease/Hyperlipidemia/Hypertension/)      HISTORY OF PRESENT ILLNESS       HPI    Hector Ortega is a 64 y.o. male     This gentleman has hyperlipidemia and family history of premature coronary artery disease, and a history of smoking.  I saw him in 08/2019 and he was complaining of chest pain with shortness of breath on exertion. He had a heart catheterization which showed a 95% mid LAD stenosis with overall normal LV function. He had drug-eluting stents placed to the LAD.  He was complaining of some chest discomfort when I saw him in April 2022.  He had a nuclear stress test in April 2022 which showed a medium sized area of severe ischemia in the inferior wall with an ejection fraction of 56%.  He went on to have a heart catheterization in April 2022.  This showed a normal left main, left anterior descending artery with a patent stent and normal circumflex.  The right coronary artery was 100% occluded with right to right collaterals proximally.  Dr. Cox did not recommend trying to open this chronic total occlusion unless medical therapy was unsuccessful.  He had a headache with isosorbide mononitrate.  He had elevated liver enzymes on atorvastatin.    He is here for routine.  He denies any chest pain.  No shortness of breath.  He is not doing any routine exercise.       VITAL SIGNS     Visit Vitals  /74   Pulse 56   Resp 16   Ht 180.3 cm (71\")   Wt 106 kg (233 lb)   SpO2 98%   BMI 32.50 kg/m²         Wt Readings from Last 3 Encounters:   04/17/25 106 kg (233 lb)   04/08/24 101 kg (223 lb)   04/06/23 106 kg (233 lb)     Body mass index is 32.5 kg/m².      PHYSICAL EXAMINATION     Constitutional:       General: Not in acute distress.  Neck:      Vascular: No carotid bruit or JVD.   Pulmonary:      " Effort: Pulmonary effort is normal.      Breath sounds: Normal breath sounds.   Cardiovascular:      Normal rate. Regular rhythm.      Murmurs: There is no murmur.   Psychiatric:         Mood and Affect: Mood and affect normal.           REVIEWED DATA       ECG 12 Lead    Date/Time: 4/17/2025 9:21 AM  Performed by: Bettina Sorto MD    Authorized by: Bettina Sorto MD  Comparison: compared with previous ECG from 4/8/2024  Similar to previous ECG  Rhythm: sinus rhythm  BPM: 56  Conduction: conduction normal  ST Segments: ST segments normal  T Waves: T waves normal    Clinical impression: normal ECG            Lipid Panel          10/16/2024    07:03   Lipid Panel   Total Cholesterol 183    Triglycerides 277    HDL Cholesterol 24    VLDL Cholesterol 48    LDL Cholesterol  111    LDL/HDL Ratio 4.32        Lab Results   Component Value Date    GLUCOSE 121 (H) 10/16/2024    BUN 17 10/16/2024    CREATININE 1.35 (H) 10/16/2024     10/16/2024    K 4.4 10/16/2024     10/16/2024    CALCIUM 9.8 10/16/2024    PROTEINTOT 7.7 10/16/2024    ALBUMIN 4.6 10/16/2024    ALT 49 (H) 10/16/2024    AST 27 10/16/2024    ALKPHOS 73 10/16/2024    BILITOT 0.5 10/16/2024    GLOB 3.1 10/16/2024    AGRATIO 1.5 10/16/2024    BCR 12.6 10/16/2024    ANIONGAP 9.1 10/16/2024    EGFR 59.7 (L) 10/16/2024       ASSESSMENT & PLAN      Diagnosis Plan   1. Coronary artery disease involving native coronary artery of native heart without angina pectoris        2. Abnormal nuclear stress test        3. Essential hypertension        4. Mixed hyperlipidemia            1.  Coronary artery disease.  He had a prior stent to the LAD and has a chronic total occlusion of the right coronary artery with a mild amount of ischemia on a nuclear stress test from April 2022.  Medical management was recommended.  He is on amlodipine, metoprolol and Ranexa.  He is also on Brilinta 60 mg twice daily.  2.  Hyperlipidemia.  He did not tolerate atorvastatin  because of elevated liver enzymes.  He takes Livalo 3 times a week.  Nevertheless his lipids are not at goal.  I would like to try him on Repatha.  He is going to contact his primary doctor about repeat blood work.  3.  Hypertension.  Blood pressure is at goal today.  4.  Tobacco use.  We discussed quitting smoking today.  He would like to.  5.  Hypothyroid on replacement  6.  Prediabetes.  Hemoglobin A1c of 6.2.  7.  Mild emphysema on CT in 2021.     Tentative plans is for follow-up in 1 year.    PSK9 or Leqvio clinical documentation checklist    Prescribing indications:    [] Patient has history of ASCVD   [] History of MI   [x] Coronary artery disease   [] Atherosclerosis seen on CT imaging   [] History of stroke   [] History of TIA   [] Peripheral arterial disease    [] Familial hypercholesterolemia    [] Patient has increased risk of ASCVD with the following comorbidities   []  Diabetes Mellitus   [x]  HTN   [] Family history coronary disease   [x] Active smoker or history of smoking    Current LDL status- (Lipid panel must be within 90 days)   [x] LDL currently not at goal    Previous Lipid Lowering therapy (select all that apply, recommend failure of 3 statin drugs)   [x] Atorvastatin   [] Pravastatin   [] Simvastatin   [] Rosuvastatin   [] Lovastatin   [x] Pitavastatin   [] Ezetimibe   [] PCSK9 monoclonal antibody    Medical history for statin therapy   [x] Patient remains on maximum tolerated statin but LDL not at goal   [] Patient experienced myalgias, myositis, joint pains that resolved when removed from therapy   [] Patient has undergone rechallenge with lower dose statin with symptom reappearance   [] Patient has known contraindications to statin drugs   [] Patient has CK greater than 10 times of upper limit   [] Other        Orders Placed This Encounter   Procedures    ECG 12 Lead     This order was created via procedure documentation     Release to patient:   Routine Release [[id number]]            MEDICATIONS         Discharge Medications            Accurate as of April 17, 2025  9:21 AM. If you have any questions, ask your nurse or doctor.                New Medications        Instructions Start Date   Evolocumab solution prefilled syringe injection  Commonly known as: REPATHA  Started by: Bettina Sorto   140 mg, Subcutaneous, Every 14 Days             Continue These Medications        Instructions Start Date   amLODIPine 5 MG tablet  Commonly known as: NORVASC   5 mg, Daily      levothyroxine 75 MCG tablet  Commonly known as: SYNTHROID, LEVOTHROID   75 mcg, Daily      metoprolol succinate XL 25 MG 24 hr tablet  Commonly known as: TOPROL-XL   25 mg, Oral, Daily      MILK THISTLE PO   175 mg, Daily      pitavastatin calcium 2 MG tablet tablet  Commonly known as: LIVALO   2 mg, 3 Times Weekly      ranolazine 500 MG 12 hr tablet  Commonly known as: RANEXA   500 mg, Oral, 2 Times Daily      ticagrelor 60 MG tablet tablet  Commonly known as: Brilinta   60 mg, Oral, 2 Times Daily                 Bettina Sorto MD  04/17/25  09:21 EDT    Part of this note may be an electronic transcription/translation of spoken language to printed text using the Dragon dictation system.

## 2025-04-28 ENCOUNTER — TELEPHONE (OUTPATIENT)
Dept: CARDIOLOGY | Age: 65
End: 2025-04-28
Payer: COMMERCIAL

## 2025-04-28 NOTE — TELEPHONE ENCOUNTER
I reached out to pt today to let him know he was approved by insurance for Repatha and to reach out to pharmacy and have them re-run medication.     PT gave VU and has no further questions or concerns

## 2025-05-01 ENCOUNTER — TRANSCRIBE ORDERS (OUTPATIENT)
Dept: ADMINISTRATIVE | Facility: HOSPITAL | Age: 65
End: 2025-05-01
Payer: COMMERCIAL

## 2025-05-01 ENCOUNTER — LAB (OUTPATIENT)
Dept: LAB | Facility: HOSPITAL | Age: 65
End: 2025-05-01
Payer: COMMERCIAL

## 2025-05-01 DIAGNOSIS — R79.89 HYPOURICEMIA: Primary | ICD-10-CM

## 2025-05-01 DIAGNOSIS — R79.89 HYPOURICEMIA: ICD-10-CM

## 2025-05-01 DIAGNOSIS — R73.09 ELEVATED GLUCOSE: ICD-10-CM

## 2025-05-01 DIAGNOSIS — R94.5 LIVER FUNCTION STUDY, ABNORMAL: ICD-10-CM

## 2025-05-01 LAB
ALBUMIN SERPL-MCNC: 4.3 G/DL (ref 3.5–5.2)
ALBUMIN/GLOB SERPL: 1.5 G/DL
ALP SERPL-CCNC: 66 U/L (ref 39–117)
ALT SERPL W P-5'-P-CCNC: 42 U/L (ref 1–41)
ANION GAP SERPL CALCULATED.3IONS-SCNC: 11.5 MMOL/L (ref 5–15)
AST SERPL-CCNC: 25 U/L (ref 1–40)
BILIRUB SERPL-MCNC: 0.5 MG/DL (ref 0–1.2)
BUN SERPL-MCNC: 16 MG/DL (ref 8–23)
BUN/CREAT SERPL: 11.9 (ref 7–25)
CALCIUM SPEC-SCNC: 9.8 MG/DL (ref 8.6–10.5)
CHLORIDE SERPL-SCNC: 102 MMOL/L (ref 98–107)
CO2 SERPL-SCNC: 25.5 MMOL/L (ref 22–29)
CREAT SERPL-MCNC: 1.35 MG/DL (ref 0.76–1.27)
EGFRCR SERPLBLD CKD-EPI 2021: 59.4 ML/MIN/1.73
GLOBULIN UR ELPH-MCNC: 2.9 GM/DL
GLUCOSE SERPL-MCNC: 141 MG/DL (ref 65–99)
HBA1C MFR BLD: 6.9 % (ref 4.8–5.6)
POTASSIUM SERPL-SCNC: 4.1 MMOL/L (ref 3.5–5.2)
PROT SERPL-MCNC: 7.2 G/DL (ref 6–8.5)
SODIUM SERPL-SCNC: 139 MMOL/L (ref 136–145)

## 2025-05-01 PROCEDURE — 80053 COMPREHEN METABOLIC PANEL: CPT

## 2025-05-01 PROCEDURE — 83036 HEMOGLOBIN GLYCOSYLATED A1C: CPT

## 2025-05-01 PROCEDURE — 36415 COLL VENOUS BLD VENIPUNCTURE: CPT

## 2025-05-19 RX ORDER — RANOLAZINE 500 MG/1
500 TABLET, EXTENDED RELEASE ORAL 2 TIMES DAILY
Qty: 180 TABLET | Refills: 3 | Status: SHIPPED | OUTPATIENT
Start: 2025-05-19

## 2025-07-09 RX ORDER — TICAGRELOR 60 MG/1
60 TABLET ORAL 2 TIMES DAILY
Qty: 180 TABLET | Refills: 3 | Status: SHIPPED | OUTPATIENT
Start: 2025-07-09

## (undated) DEVICE — HI-TORQUE BALANCE MIDDLEWEIGHT GUIDE WIRE .014 STRAIGHT TIP 3.0 CM X 190 CM: Brand: HI-TORQUE BALANCE MIDDLEWEIGHT

## (undated) DEVICE — DEV INDEFLATOR

## (undated) DEVICE — CATH4F INF PIG 145Â° MOD 110CM: Brand: INFINITI

## (undated) DEVICE — PK CATH CARD 40

## (undated) DEVICE — GLIDESHEATH SLENDER STAINLESS STEEL KIT: Brand: GLIDESHEATH SLENDER

## (undated) DEVICE — CATH DIAG IMPULSE FR4 5F 100CM

## (undated) DEVICE — NC TREK CORONARY DILATATION CATHETER 3.5 MM X 20 MM / RAPID-EXCHANGE: Brand: NC TREK

## (undated) DEVICE — BND PRESS RADL COMFRT 14IN STRL

## (undated) DEVICE — NC TREK CORONARY DILATATION CATHETER 3.75 MM X 12 MM / RAPID-EXCHANGE: Brand: NC TREK

## (undated) DEVICE — CATH DIAG IMPULSE FL3.5 5F 100CM

## (undated) DEVICE — GLIDESHEATH BASIC HYDROPHILIC COATED INTRODUCER SHEATH: Brand: GLIDESHEATH

## (undated) DEVICE — KT MANIFLD CARDIAC

## (undated) DEVICE — TREK CORONARY DILATATION CATHETER 3.50 MM X 20 MM / RAPID-EXCHANGE: Brand: TREK

## (undated) DEVICE — CATH DIAG IMPULSE PIG 5F 100CM

## (undated) DEVICE — GW EMR FIX EXCHG J STD .035 3MM 260CM

## (undated) DEVICE — RUNTHROUGH NS EXTRA FLOPPY PTCA GUIDEWIRE: Brand: RUNTHROUGH

## (undated) DEVICE — 6F .070 XB 3.5 100CM: Brand: VISTA BRITE TIP